# Patient Record
Sex: FEMALE | Race: WHITE | Employment: FULL TIME | URBAN - METROPOLITAN AREA
[De-identification: names, ages, dates, MRNs, and addresses within clinical notes are randomized per-mention and may not be internally consistent; named-entity substitution may affect disease eponyms.]

---

## 2017-10-25 ENCOUNTER — HOSPITAL ENCOUNTER (OUTPATIENT)
Dept: MAMMOGRAPHY | Age: 42
Discharge: HOME | End: 2017-10-25
Attending: SPECIALIST
Payer: COMMERCIAL

## 2017-10-25 DIAGNOSIS — Z12.31 VISIT FOR SCREENING MAMMOGRAM: ICD-10-CM

## 2017-10-25 PROCEDURE — 77067 SCR MAMMO BI INCL CAD: CPT

## 2017-12-15 DIAGNOSIS — F41.8 DEPRESSION WITH ANXIETY: ICD-10-CM

## 2017-12-15 NOTE — TELEPHONE ENCOUNTER
From: Mary Roth  To: Gabi Alexander MD  Sent: 12/15/2017 7:27 AM EST  Subject: Medication Renewal Request    Original authorizing provider: MD Misha Mcdonnellan ABIGAIL Roth would like a refill of the following medications:  PRISTIQ 50 mg tablet [Mary Byrnes MD]    Preferred pharmacy: [facility]ACY #[id number] [address]:  [facility], I thought my pharmacy had requested approval to refill this medicine. I checked the site and it does not look like it. I have one pill left! May I please get a refill on this today? Many thanks, Marce Harper sent pt a note that she needs to schedule a f/u appt right away.

## 2017-12-16 RX ORDER — DESVENLAFAXINE SUCCINATE 50 MG/1
50 TABLET, EXTENDED RELEASE ORAL DAILY
Qty: 90 TAB | Refills: 0 | Status: SHIPPED | OUTPATIENT
Start: 2017-12-16 | End: 2018-01-10 | Stop reason: SDUPTHER

## 2018-01-10 ENCOUNTER — OFFICE VISIT (OUTPATIENT)
Dept: FAMILY MEDICINE CLINIC | Age: 43
End: 2018-01-10

## 2018-01-10 VITALS
BODY MASS INDEX: 31.52 KG/M2 | TEMPERATURE: 98.5 F | SYSTOLIC BLOOD PRESSURE: 110 MMHG | RESPIRATION RATE: 18 BRPM | HEART RATE: 82 BPM | DIASTOLIC BLOOD PRESSURE: 74 MMHG | OXYGEN SATURATION: 97 % | WEIGHT: 208 LBS | HEIGHT: 68 IN

## 2018-01-10 DIAGNOSIS — E78.1 HIGH TRIGLYCERIDES: ICD-10-CM

## 2018-01-10 DIAGNOSIS — F41.8 DEPRESSION WITH ANXIETY: ICD-10-CM

## 2018-01-10 DIAGNOSIS — Z00.00 ROUTINE GENERAL MEDICAL EXAMINATION AT A HEALTH CARE FACILITY: Primary | ICD-10-CM

## 2018-01-10 DIAGNOSIS — E01.0 THYROMEGALY: ICD-10-CM

## 2018-01-10 DIAGNOSIS — E55.9 VITAMIN D DEFICIENCY: ICD-10-CM

## 2018-01-10 PROBLEM — S83.241A TEAR OF MEDIAL MENISCUS OF RIGHT KNEE, CURRENT: Status: ACTIVE | Noted: 2017-06-07

## 2018-01-10 PROBLEM — G43.109 MIGRAINE WITH AURA AND WITHOUT STATUS MIGRAINOSUS, NOT INTRACTABLE: Status: ACTIVE | Noted: 2018-01-10

## 2018-01-10 PROBLEM — M22.2X1 PATELLOFEMORAL STRESS SYNDROME OF RIGHT KNEE: Status: ACTIVE | Noted: 2017-06-07

## 2018-01-10 RX ORDER — DESVENLAFAXINE SUCCINATE 50 MG/1
50 TABLET, EXTENDED RELEASE ORAL DAILY
Qty: 90 TAB | Refills: 3 | Status: SHIPPED | OUTPATIENT
Start: 2018-01-10 | End: 2019-03-05 | Stop reason: SDUPTHER

## 2018-01-10 RX ORDER — MELOXICAM 15 MG/1
TABLET ORAL
COMMUNITY
Start: 2017-10-09 | End: 2018-08-20

## 2018-01-10 NOTE — PATIENT INSTRUCTIONS
Body Mass Index: Care Instructions  Your Care Instructions    Body mass index (BMI) can help you see if your weight is raising your risk for health problems. It uses a formula to compare how much you weigh with how tall you are. · A BMI lower than 18.5 is considered underweight. · A BMI between 18.5 and 24.9 is considered healthy. · A BMI between 25 and 29.9 is considered overweight. A BMI of 30 or higher is considered obese. If your BMI is in the normal range, it means that you have a lower risk for weight-related health problems. If your BMI is in the overweight or obese range, you may be at increased risk for weight-related health problems, such as high blood pressure, heart disease, stroke, arthritis or joint pain, and diabetes. If your BMI is in the underweight range, you may be at increased risk for health problems such as fatigue, lower protection (immunity) against illness, muscle loss, bone loss, hair loss, and hormone problems. BMI is just one measure of your risk for weight-related health problems. You may be at higher risk for health problems if you are not active, you eat an unhealthy diet, or you drink too much alcohol or use tobacco products. Follow-up care is a key part of your treatment and safety. Be sure to make and go to all appointments, and call your doctor if you are having problems. It's also a good idea to know your test results and keep a list of the medicines you take. How can you care for yourself at home? · Practice healthy eating habits. This includes eating plenty of fruits, vegetables, whole grains, lean protein, and low-fat dairy. · If your doctor recommends it, get more exercise. Walking is a good choice. Bit by bit, increase the amount you walk every day. Try for at least 30 minutes on most days of the week. · Do not smoke. Smoking can increase your risk for health problems. If you need help quitting, talk to your doctor about stop-smoking programs and medicines. These can increase your chances of quitting for good. · Limit alcohol to 2 drinks a day for men and 1 drink a day for women. Too much alcohol can cause health problems. If you have a BMI higher than 25  · Your doctor may do other tests to check your risk for weight-related health problems. This may include measuring the distance around your waist. A waist measurement of more than 40 inches in men or 35 inches in women can increase the risk of weight-related health problems. · Talk with your doctor about steps you can take to stay healthy or improve your health. You may need to make lifestyle changes to lose weight and stay healthy, such as changing your diet and getting regular exercise. If you have a BMI lower than 18.5  · Your doctor may do other tests to check your risk for health problems. · Talk with your doctor about steps you can take to stay healthy or improve your health. You may need to make lifestyle changes to gain or maintain weight and stay healthy, such as getting more healthy foods in your diet and doing exercises to build muscle. Where can you learn more? Go to http://osmin-dustin.info/. Enter S176 in the search box to learn more about \"Body Mass Index: Care Instructions. \"  Current as of: October 13, 2016  Content Version: 11.4  © 0467-0779 Healthwise, Incorporated. Care instructions adapted under license by [employer] (which disclaims liability or warranty for this information). If you have questions about a medical condition or this instruction, always ask your healthcare professional. [place] [address] any warranty or liability for your use of this information. Well Visit, Ages 25 to 48: Care Instructions  Your Care Instructions    Physical exams can help you stay healthy. Your doctor has checked your overall health and may have suggested ways to take good care of yourself. He or she also may have recommended tests.  At home, you can help prevent illness with healthy eating, regular exercise, and other steps. Follow-up care is a key part of your treatment and safety. Be sure to make and go to all appointments, and call your doctor if you are having problems. It's also a good idea to know your test results and keep a list of the medicines you take. How can you care for yourself at home? · Reach and stay at a healthy weight. This will lower your risk for many problems, such as obesity, diabetes, heart disease, and high blood pressure. · Get at least 30 minutes of physical activity on most days of the week. Walking is a good choice. You also may want to do other activities, such as running, swimming, cycling, or playing tennis or team sports. Discuss any changes in your exercise program with your doctor. · Do not smoke or allow others to smoke around you. If you need help quitting, talk to your doctor about stop-smoking programs and medicines. These can increase your chances of quitting for good. · Talk to your doctor about whether you have any risk factors for sexually transmitted infections (STIs). Having one sex partner (who does not have STIs and does not have sex with anyone else) is a good way to avoid these infections. · Use birth control if you do not want to have children at this time. Talk with your doctor about the choices available and what might be best for you. · Protect your skin from too much sun. When you're outdoors from 10 a.m. to 4 p.m., stay in the shade or cover up with clothing and a hat with a wide brim. Wear sunglasses that block UV rays. Even when it's cloudy, put broad-spectrum sunscreen (SPF 30 or higher) on any exposed skin. · See a dentist one or two times a year for checkups and to have your teeth cleaned. · Wear a seat belt in the car. · Drink alcohol in moderation, if at all. That means no more than 2 drinks a day for men and 1 drink a day for women.   Follow your doctor's advice about when to have certain tests. These tests can spot problems early. For everyone  · Cholesterol. Have the fat (cholesterol) in your blood tested after age 21. Your doctor will tell you how often to have this done based on your age, family history, or other things that can increase your risk for heart disease. · Blood pressure. Have your blood pressure checked during a routine doctor visit. Your doctor will tell you how often to check your blood pressure based on your age, your blood pressure results, and other factors. · Vision. Talk with your doctor about how often to have a glaucoma test.  · Diabetes. Ask your doctor whether you should have tests for diabetes. · Colon cancer. Have a test for colon cancer at age 48. You may have one of several tests. If you are younger than 48, you may need a test earlier if you have any risk factors. Risk factors include whether you already had a precancerous polyp removed from your colon or whether your parent, brother, sister, or child has had colon cancer. For women  · Breast exam and mammogram. Talk to your doctor about when you should have a clinical breast exam and a mammogram. Medical experts differ on whether and how often women under 50 should have these tests. Your doctor can help you decide what is right for you. · Pap test and pelvic exam. Begin Pap tests at age 24. A Pap test is the best way to find cervical cancer. The test often is part of a pelvic exam. Ask how often to have this test.  · Tests for sexually transmitted infections (STIs). Ask whether you should have tests for STIs. You may be at risk if you have sex with more than one person, especially if your partners do not wear condoms. For men  · Tests for sexually transmitted infections (STIs). Ask whether you should have tests for STIs. You may be at risk if you have sex with more than one person, especially if you do not wear a condom.   · Testicular cancer exam. Ask your doctor whether you should check your testicles regularly. · Prostate exam. Talk to your doctor about whether you should have a blood test (called a PSA test) for prostate cancer. Experts differ on whether and when men should have this test. Some experts suggest it if you are older than 39 and are -American or have a father or brother who got prostate cancer when he was younger than 72. When should you call for help? Watch closely for changes in your health, and be sure to contact your doctor if you have any problems or symptoms that concern you. Where can you learn more? Go to http://osmin-dustin.info/. Enter P072 in the search box to learn more about \"Well Visit, Ages 25 to 48: Care Instructions. \"  Current as of: May 12, 2017  Content Version: 11.4  © 7253-2848 [employer]. Care instructions adapted under license by "[employer]," (which disclaims liability or warranty for this information). If you have questions about a medical condition or this instruction, always ask your healthcare professional. [place] [address] any warranty or liability for your use of this information. High Cholesterol: Care Instructions  Your Care Instructions    Cholesterol is a type of fat in your blood. It is needed for many body functions, such as making new cells. Cholesterol is made by your body. It also comes from food you eat. High cholesterol means that you have too much of the fat in your blood. This raises your risk of a heart attack and stroke. LDL and HDL are part of your total cholesterol. LDL is the \"bad\" cholesterol. High LDL can raise your risk for heart disease, heart attack, and stroke. HDL is the \"good\" cholesterol. It helps clear bad cholesterol from the body. High HDL is linked with a lower risk of heart disease, heart attack, and stroke. Your cholesterol levels help your doctor find out your risk for having a heart attack or stroke.  You and your doctor can talk about whether you need to lower your risk and what treatment is best for you. A heart-healthy lifestyle along with medicines can help lower your cholesterol and your risk. The way you choose to lower your risk will depend on how high your risk is for heart attack and stroke. It will also depend on how you feel about taking medicines. Follow-up care is a key part of your treatment and safety. Be sure to make and go to all appointments, and call your doctor if you are having problems. It's also a good idea to know your test results and keep a list of the medicines you take. How can you care for yourself at home? · Eat a variety of foods every day. Good choices include fruits, vegetables, whole grains (like oatmeal), dried beans and peas, nuts and seeds, soy products (like tofu), and fat-free or low-fat dairy products. · Replace butter, margarine, and hydrogenated or partially hydrogenated oils with olive and canola oils. (Canola oil margarine without trans fat is fine.)  · Replace red meat with fish, poultry, and soy protein (like tofu). · Limit processed and packaged foods like chips, crackers, and cookies. · Bake, broil, or steam foods. Don't rangel them. · Be physically active. Get at least 30 minutes of exercise on most days of the week. Walking is a good choice. You also may want to do other activities, such as running, swimming, cycling, or playing tennis or team sports. · Stay at a healthy weight or lose weight by making the changes in eating and physical activity listed above. Losing just a small amount of weight, even 5 to 10 pounds, can reduce your risk for having a heart attack or stroke. · Do not smoke. When should you call for help? Watch closely for changes in your health, and be sure to contact your doctor if:  ? · You need help making lifestyle changes. ? · You have questions about your medicine. Where can you learn more? Go to http://osmin-dustin.info/.   Enter T592 in the search box to learn more about \"High Cholesterol: Care Instructions. \"  Current as of: September 21, 2016  Content Version: 11.4  © 6182-3127 Healthwise, Incorporated. Care instructions adapted under license by [employer] (which disclaims liability or warranty for this information). If you have questions about a medical condition or this instruction, always ask your healthcare professional. [address] any warranty or liability for your use of this information.

## 2018-01-10 NOTE — PROGRESS NOTES
Chief Complaint   Patient presents with    Complete Physical     not fasting - has gyn for well woman      1. Have you been to the ER, urgent care clinic since your last visit? Hospitalized since your last visit? No    2. Have you seen or consulted any other health care providers outside of the [address] [place] [address] since your last visit? Include any pap smears or colon screening.  No

## 2018-01-10 NOTE — PROGRESS NOTES
HISTORY OF PRESENT ILLNESS   HPI  Annual CPE (not fasting today). Sees gyn annually for well woman visits, gyn exams in the fall and all reportedly normal.  Her periods are regular q month, lasts 5 days, heavy the first 2-3 then light. No issues. Overall has been getting along well and feeling well in general.  Pristiq working well for her anxiety and depression, mood has been stable and well controlled. Since 1-1-18 she has been following a low carb diet and states she can notice a difference in how she feels in general. Less sluggish. She reports h/o migraine headaches in her childhood and again in her 21's for a while and they hadnt really bothered her up until 2 mild migraines that she had in the fall, none since then. They were located more frontal and periorbital region, preceded by visual aura. Easily went away w/ OTC medication and didn't last longer than a day or 2. Her childhood migraines were associated w/ N/V, but not so much in her adult life. All of them are typically associated w/ visual aura, wavy lives but no other neuro symptoms, deficits or other sequela. She saw her eye doctor w/in the past year, no major concerns. She states she never really had to see a neurologist and suffered no complications from them. REVIEW OF SYMPTOMS     Review of Systems   Constitutional: Negative. HENT: Negative. No neck/throat pain, hoarseness, dysphagia   Eyes: Negative. Respiratory: Negative. Cardiovascular: Negative. Gastrointestinal: Negative. Genitourinary: Negative. Musculoskeletal:        Followed by Ortho for PFS of knees, takes Mobic prn but hasnt needed any x 2months   Neurological: Negative for dizziness, sensory change, speech change and focal weakness. Endo/Heme/Allergies: Positive for environmental allergies (uses Claritin and Rhinocort seasonally, not lately).    Psychiatric/Behavioral: Negative.            PROBLEM LIST/MEDICAL HISTORY      Problem List  Date Reviewed: 1/10/2018          Codes Class Noted    Patellofemoral stress syndrome of right knee ICD-10-CM: M22.2X1  ICD-9-CM: 719.46  2017        Tear of medial meniscus of right knee, current ICD-10-CM: O75.576U  ICD-9-CM: 836.0  2017        Mild Thyromegaly w/o cysts or nodules and without obstructive symptoms ICD-10-CM: E01.0  ICD-9-CM: 240.9  2016    Overview Addendum 1/10/2018 11:36 AM by Brandon Varma MD      : Mildly enlarged left thyroid lobe             Vitamin D deficiency ICD-10-CM: E55.9  ICD-9-CM: 268.9  2014    Overview Signed 2014  1:33 PM by Brandon Varma MD                  Mildly Elevated Triglycerides ICD-10-CM: E78.1  ICD-9-CM: 272.1  2014        Degenerative arthritis of great toe w/ bone spurs ICD-10-CM: M19.079  ICD-9-CM: 715.97  2014    Overview Signed 2014  2:06 PM by Brandon Varma MD     S/p CSI of toe per foot doctor; surgery is recommended              Breast cyst ICD-10-CM: N60.09  ICD-9-CM: 610.0  Unknown    Overview Signed 3/31/2010  3:43 PM by Brandon Varma MD     Excised R Breast : Dr Sarah Shell             Seasonal allergic rhinitis ICD-10-CM: J30.2  ICD-9-CM: 477.9  Unknown    Overview Signed 3/31/2010  3:44 PM by Brandon Varma MD     Spring/             Depression with anxiety ICD-10-CM: F41.8  ICD-9-CM: 300.4  Unknown        Postpartum depression ICD-10-CM: F53  ICD-9-CM: 648.44, 737  2009    Overview Addendum 2014  1:34 PM by Brandon Varma MD     3/18/09  @ 38 wks fetal distress  3/2013 treated again for post partum             C section ICD-9-CM: V45.89  3/18/2009    Overview Signed 3/31/2010  3:41 PM by Brandon Varma MD     GYN Dr. Loco Hamilton;  Fetal distress               H/O Migraine Headaches (details in HPI above)        PAST SURGICAL HISTORY       Past Surgical History:   Procedure Laterality Date    BREAST SURGERY PROCEDURE UNLISTED   breast right cyst benign    HX BREAST BIOPSY Right 2009    neg    HX  SECTION  2009    HX  SECTION  3/17/2013    HX LAP CHOLECYSTECTOMY  13    Dr Jr Hemphill for gallstones    HX ORTHOPAEDIC Right 2014    cheilectomy; right foot, spur, arthritis         MEDICATIONS      Current Outpatient Prescriptions   Medication Sig    desvenlafaxine succinate (PRISTIQ) 50 mg ER tablet Take 1 Tab by mouth daily.  loratadine (CLARITIN) 10 mg tablet Take 10 mg by mouth daily as needed.  multivitamin (ONE A DAY) tablet Take 1 Tab by mouth daily.  meloxicam (MOBIC) 15 mg tablet TAKE 1 TABLET (15 MG TOTAL) BY MOUTH DAILY AS NEEDED    budesonide (RHINOCORT AQUA) 32 mcg/actuation nasal spray 2 Sprays by Both Nostrils route daily. (Patient taking differently: 2 Sprays by Both Nostrils route daily as needed.)     No current facility-administered medications for this visit.            ALLERGIES     Allergies   Allergen Reactions    Adhesive Rash and Itching    Sulfa (Sulfonamide Antibiotics) Nausea Only and Other (comments)     dizziness    Wellbutrin [Bupropion Hcl] Other (comments)     Increased agitation, not effective for mood          SOCIAL HISTORY       Social History     Social History    Marital status:      Spouse name: N/A    Number of children: 2    Years of education: N/A     Occupational History    Teacher at [employer], full time    [place] [employer]     Social History Main Topics    Smoking status: Never Smoker    Smokeless tobacco: Never Used    Alcohol use Yes      Comment: socially a few x a month, occ    Drug use: No    Sexual activity: Yes     Partners: Male     Birth control/ protection: Condom      Comment: Stopped her BCP 2013 d/t fluid retention     Other Topics Concern    Caffeine Concern No     1 cup of coffee a day    Weight Concern No    Special Diet Yes     since 20: low carb diet and more portion control    Exercise Yes since summer 2016: 2 x a week: cycles x 45 minutes     Social History Narrative        IMMUNIZATIONS  Immunization History   Administered Date(s) Administered    Influenza Vaccine 10/15/2015, 10/20/2016, 2017    Influenza Vaccine (Quad) PF 2015    TD Vaccine 2011         FAMILY HISTORY     Family History   Problem Relation Age of Onset    High Cholesterol Mother     Hypertension Mother     Elevated Lipids Mother     Depression Mother      Zoloft    Thyroid Disease Mother     Liver Disease Mother 79     autoimmune hepatitis    Asthma Father     Heart Disease Father      cardiomegaly    Other Brother      good health    Other Sister      good health    Stroke Maternal Grandmother       [de-identified]    Heart Disease Maternal Grandmother     Depression Maternal Grandmother     Heart Disease Maternal Grandfather       80    Dementia Maternal Grandfather     Heart Disease Paternal Grandmother       [de-identified]    Cancer Paternal Grandfather      oral CA pipe smoker,  80's    Depression Maternal Aunt      2 aunts    Thyroid Disease Maternal Aunt      2 aunts    Depression Maternal Uncle     Attention Deficit Hyperactivity Disorder Son 8     and Dyslexia    No Known Problems Son          VITALS     Visit Vitals    /74 (BP 1 Location: Left arm, BP Patient Position: Sitting)    Pulse 82    Temp 98.5 °F (36.9 °C) (Oral)    Resp 18    Ht 5' 8\" (1.727 m)    Wt 208 lb (94.3 kg)    LMP 2018    SpO2 97%    BMI 31.63 kg/m2          PHYSICAL EXAMINATION     Physical Exam   Constitutional: She is oriented to person, place, and time and well-developed, well-nourished, and in no distress. HENT:   Right Ear: Tympanic membrane normal.   Left Ear: Tympanic membrane normal.   Nose: Nose normal.   Mouth/Throat: Oropharynx is clear and moist. No oropharyngeal exudate. Eyes: Conjunctivae and EOM are normal. Pupils are equal, round, and reactive to light.    Neck: Thyromegaly (mild) present. Cardiovascular: Normal rate, regular rhythm and normal heart sounds. No murmur heard. Pulmonary/Chest: Effort normal and breath sounds normal. No respiratory distress. Abdominal: Soft. Bowel sounds are normal. She exhibits no distension and no mass. There is no tenderness. Musculoskeletal: Normal range of motion. She exhibits no edema or tenderness. Lymphadenopathy:     She has no cervical adenopathy. Neurological: She is alert and oriented to person, place, and time. No cranial nerve deficit. Gait normal. Coordination normal.   Skin: Skin is warm. Psychiatric: Mood and affect normal.   Vitals reviewed.              ASSESSMENT & PLAN       ICD-10-CM ICD-9-CM    1. Routine general medical examination at a health care facility Z00.00 V70.0 LIPID PANEL      METABOLIC PANEL, COMPREHENSIVE      CBC W/O DIFF      TSH 3RD GENERATION      VITAMIN D, 25 HYDROXY   2. Mildly Elevated Triglycerides E78.1 272.1 LIPID PANEL   3. Vitamin D deficiency E55.9 268.9 VITAMIN D, 25 HYDROXY   4. Depression with anxiety, well controlled on Pristiq F41.8 300.4 desvenlafaxine succinate (PRISTIQ) 50 mg ER tablet   5. Mild Thyromegaly w/o cysts or nodules and without obstructive symptoms E01.0 240.9 TSH 3RD GENERATION     She will RTC for the above labs fasting on 1-31-18   Reviewed diet, exercise and weight control; Patient counseled about current BMI, goals, diet, nutrition, exercise, weight management. Nutrition/meal planning discussed. Monitor weights. Appropriate patient education materials included with or without corresponding AVS via handout or MyChart if activated. Reassess at next follow up visit. Cardiovascular risk and specific lipid/LDL goals reviewed  Sees gyn annually for well woman visits/gyn exams, reportedly normal in the fall. Mammogram screen negative   Further follow up & other recommendations pending review of labs. If all remains good and stable, RTC 1 yr CPE.  Follow up sooner prn

## 2018-01-15 DIAGNOSIS — E55.9 VITAMIN D DEFICIENCY: ICD-10-CM

## 2018-01-15 DIAGNOSIS — Z00.00 ROUTINE GENERAL MEDICAL EXAMINATION AT A HEALTH CARE FACILITY: ICD-10-CM

## 2018-01-16 LAB
25(OH)D3+25(OH)D2 SERPL-MCNC: 21.5 NG/ML (ref 30–100)
ALBUMIN SERPL-MCNC: 4.3 G/DL (ref 3.5–5.5)
ALBUMIN/GLOB SERPL: 1.8 {RATIO} (ref 1.2–2.2)
ALP SERPL-CCNC: 60 IU/L (ref 39–117)
ALT SERPL-CCNC: 22 IU/L (ref 0–32)
AST SERPL-CCNC: 13 IU/L (ref 0–40)
BILIRUB SERPL-MCNC: 0.4 MG/DL (ref 0–1.2)
BUN SERPL-MCNC: 14 MG/DL (ref 6–24)
BUN/CREAT SERPL: 23 (ref 9–23)
CALCIUM SERPL-MCNC: 8.9 MG/DL (ref 8.7–10.2)
CHLORIDE SERPL-SCNC: 103 MMOL/L (ref 96–106)
CHOLEST SERPL-MCNC: 177 MG/DL (ref 100–199)
CO2 SERPL-SCNC: 25 MMOL/L (ref 18–29)
CREAT SERPL-MCNC: 0.62 MG/DL (ref 0.57–1)
ERYTHROCYTE [DISTWIDTH] IN BLOOD BY AUTOMATED COUNT: 13.3 % (ref 12.3–15.4)
GLOBULIN SER CALC-MCNC: 2.4 G/DL (ref 1.5–4.5)
GLUCOSE SERPL-MCNC: 89 MG/DL (ref 65–99)
HCT VFR BLD AUTO: 39 % (ref 34–46.6)
HDLC SERPL-MCNC: 46 MG/DL
HGB BLD-MCNC: 12.9 G/DL (ref 11.1–15.9)
INTERPRETATION, 910389: NORMAL
LDLC SERPL CALC-MCNC: 111 MG/DL (ref 0–99)
MCH RBC QN AUTO: 30.3 PG (ref 26.6–33)
MCHC RBC AUTO-ENTMCNC: 33.1 G/DL (ref 31.5–35.7)
MCV RBC AUTO: 92 FL (ref 79–97)
PLATELET # BLD AUTO: 273 X10E3/UL (ref 150–379)
POTASSIUM SERPL-SCNC: 4.5 MMOL/L (ref 3.5–5.2)
PROT SERPL-MCNC: 6.7 G/DL (ref 6–8.5)
RBC # BLD AUTO: 4.26 X10E6/UL (ref 3.77–5.28)
SODIUM SERPL-SCNC: 137 MMOL/L (ref 134–144)
TRIGL SERPL-MCNC: 101 MG/DL (ref 0–149)
TSH SERPL DL<=0.005 MIU/L-ACNC: 1.32 UIU/ML (ref 0.45–4.5)
VLDLC SERPL CALC-MCNC: 20 MG/DL (ref 5–40)
WBC # BLD AUTO: 3.1 X10E3/UL (ref 3.4–10.8)

## 2018-01-28 ENCOUNTER — TELEPHONE (OUTPATIENT)
Dept: FAMILY MEDICINE CLINIC | Age: 43
End: 2018-01-28

## 2018-01-28 DIAGNOSIS — D70.8 OTHER NEUTROPENIA (HCC): Primary | ICD-10-CM

## 2018-01-28 NOTE — TELEPHONE ENCOUNTER
BS, liver, kidney, thyroid normal    WBC slightly on low end. Can just repeat lab only in a few weeks, no appt needed, no need to fast, order pended    LDL mildly elevated but acceptable for her being low risk  HDL 46, goal for women >50. Adhere to the following Lifestyle Modifications below:  Diet:  ~Consume a dietary pattern that emphasizes intake of vegetables, fruits, whole grains, poultry, fish, low fat dairy, legumes, non tropical vegetable oils and nuts. ~Avoid red meat, saturated fats, fried foods, sweets, and sugars. ~Avoid added salt and if you have a history of high blood pressure also avoid added salt and limit sodium intake to < 2 grams per day. ~Reference the AHA (American Heart Association) Diet or DASH diet for additional guidelines. ~Limit alcohol to no more than 1 standard drink per day for women and 2 for men (ie/ 12 oz beer, 5 oz wine, 1.5 oz liquor). ~Avoid tobacco products. ~Limit caffeine to no more than 1-2 small servings per day. Exercise:  ~Get regular moderate intensity cardio/aerobic exercise at least 150 minutes per week ie/ 30-50 minutes 3-4 x a week. Your vitamin D level is measured as low or low normal, but there is no standard normal range used by all laboratories. The [place] of [employer] recommends a blood level of 20 ng/mL of vitamin D for healthy bones, but many labs reference at least a level of 30. However too much vitamin D can also be harmful and carries health risks as well. Children 1-17 yo, pregnant women and healthy low risk adults up to age 71 yo should consume at least 600 to 800 international units of vitamin D3 daily. Foods that contain vitamin D include:  · [place], tuna, and mackerel. These are some of the best foods to eat when you need to get more vitamin D.  · Cheese, egg yolks, and beef liver. These foods have vitamin D in small amounts.   · Milk, soy drinks, orange juice, yogurt, margarine, and some kinds of cereal have vitamin D added to them.     CPE 1 yr

## 2018-04-18 DIAGNOSIS — D70.8 OTHER NEUTROPENIA (HCC): ICD-10-CM

## 2018-04-19 LAB
BASOPHILS # BLD AUTO: 0 X10E3/UL (ref 0–0.2)
BASOPHILS NFR BLD AUTO: 0 %
EOSINOPHIL # BLD AUTO: 0.1 X10E3/UL (ref 0–0.4)
EOSINOPHIL NFR BLD AUTO: 2 %
ERYTHROCYTE [DISTWIDTH] IN BLOOD BY AUTOMATED COUNT: 13.5 % (ref 12.3–15.4)
HCT VFR BLD AUTO: 39.2 % (ref 34–46.6)
HGB BLD-MCNC: 13.2 G/DL (ref 11.1–15.9)
IMM GRANULOCYTES # BLD: 0 X10E3/UL (ref 0–0.1)
IMM GRANULOCYTES NFR BLD: 0 %
LYMPHOCYTES # BLD AUTO: 1.5 X10E3/UL (ref 0.7–3.1)
LYMPHOCYTES NFR BLD AUTO: 33 %
MCH RBC QN AUTO: 30.3 PG (ref 26.6–33)
MCHC RBC AUTO-ENTMCNC: 33.7 G/DL (ref 31.5–35.7)
MCV RBC AUTO: 90 FL (ref 79–97)
MONOCYTES # BLD AUTO: 0.4 X10E3/UL (ref 0.1–0.9)
MONOCYTES NFR BLD AUTO: 8 %
NEUTROPHILS # BLD AUTO: 2.6 X10E3/UL (ref 1.4–7)
NEUTROPHILS NFR BLD AUTO: 57 %
PLATELET # BLD AUTO: 281 X10E3/UL (ref 150–379)
RBC # BLD AUTO: 4.36 X10E6/UL (ref 3.77–5.28)
SPECIMEN STATUS REPORT, ROLRST: NORMAL
WBC # BLD AUTO: 4.5 X10E3/UL (ref 3.4–10.8)

## 2018-04-23 ENCOUNTER — TELEPHONE (OUTPATIENT)
Dept: FAMILY MEDICINE CLINIC | Age: 43
End: 2018-04-23

## 2018-05-29 ENCOUNTER — TELEPHONE (OUTPATIENT)
Dept: FAMILY MEDICINE CLINIC | Age: 43
End: 2018-05-29

## 2018-05-29 NOTE — TELEPHONE ENCOUNTER
Fasting BS was 129        HDL 49    Work on the following and have a fasting follow up in 8-12 weeks to reassess. Meal Planning and Exercise:  ~Avoid sweets and sugars. ~Limit carbohydrate intake to between 30-45 grams of carbs max per meal and no more than 15 grams of carbs per snack  ~Do not skip meals. ~Eat light snacks between meals that are low in fat and high in protein. ~Divide your plate by types of foods. Put non-starchy vegetables on half the plate, meat or other protein food on one-quarter of the plate, and a grain or starchy vegetable in the final quarter of the plate. Keep starches dark in color trying to void white starches in general.  ~Limit alcohol to no more than 1 standard drink per day for women and 2 for men (ie/ 12 oz beer, 5 oz wine, 1.5 oz liquor). ~Get regular moderate intensity cardio/aerobic exercise at least 150 minutes per week ie/ 30-50 minutes 3-4 x a week. ~Reference the ADA (American Diabetes Association Diet) for additional nutrition tips. ~We can offer referral to a certified diabetes educator or our nutrition services programs if needed.

## 2018-05-29 NOTE — TELEPHONE ENCOUNTER
----- Message from Delbert Berman LPN sent at 4/63/5532  1:28 PM EDT -----  Regarding: FW: Test Results Question  Contact: [phone]  I printed off the labs.  ----- Message -----     From: Susan Austin     Sent: 5/29/2018  10:49 AM       To: [facility]  Subject: RE: Test Results Question                        Yes I was fasting. I had the blood drawn first thing in the am.  ----- Message -----  From: Delbert Berman LPN  Sent: 7/43/7228  9:52 AM EDT  To: Karen Roth  Subject: RE: Test Results Question    Were you fasting when you had this test?  If so how long? If not when had you eaten last?  MJ    ----- Message -----     From: Tonia Roth     Sent: 5/27/2018  5:33 PM EDT       To: Betzaida Ly MD  Subject: Test Results Question    Hello,    For our health insurance through my 's employer, we have to have an annual biometrics screening. I had this blood work completed at the start of May. I am concerned by my glucose level, especially compared to the level I read just a few months ago in January. I am wondering if I need it to be rechecked or have further testing done. I am attaching the results for Dr. Ofelia Watson; review.     Many thanks,  [email or web address]

## 2018-08-08 ENCOUNTER — TELEPHONE (OUTPATIENT)
Dept: FAMILY MEDICINE CLINIC | Age: 43
End: 2018-08-08

## 2018-08-08 DIAGNOSIS — E78.1 HIGH TRIGLYCERIDES: ICD-10-CM

## 2018-08-08 DIAGNOSIS — R73.01 IMPAIRED FASTING GLUCOSE: Primary | ICD-10-CM

## 2018-08-08 DIAGNOSIS — E01.0 THYROMEGALY: ICD-10-CM

## 2018-08-08 NOTE — TELEPHONE ENCOUNTER
----- Message from [address] [place] [address]. [employer]il sent at 8/8/2018  1:39 PM EDT -----  Regarding: Non-Urgent Medical Question  Contact: [phone]  Artur JIMENEZ,    I have a follow up blood work appointment with Dr. Alaina Thornton on August 20 at 8:15. I plan to go on Monday, August 13 for the blood work. You asked me to email you 10 days out so that you could put in the order for the blood work. So, this is my email reminder! I would really like my thyroid checked so can that be included in the order?     Many thanks,  [place]

## 2018-08-14 LAB
CHOLEST SERPL-MCNC: 187 MG/DL (ref 100–199)
EST. AVERAGE GLUCOSE BLD GHB EST-MCNC: 100 MG/DL
GLUCOSE SERPL-MCNC: 92 MG/DL (ref 65–99)
HBA1C MFR BLD: 5.1 % (ref 4.8–5.6)
HDLC SERPL-MCNC: 50 MG/DL
INTERPRETATION, 910389: NORMAL
LDLC SERPL CALC-MCNC: 113 MG/DL (ref 0–99)
T3 SERPL-MCNC: 106 NG/DL (ref 71–180)
T4 FREE SERPL-MCNC: 1.2 NG/DL (ref 0.82–1.77)
TRIGL SERPL-MCNC: 121 MG/DL (ref 0–149)
TSH SERPL DL<=0.005 MIU/L-ACNC: 2.4 UIU/ML (ref 0.45–4.5)
VLDLC SERPL CALC-MCNC: 24 MG/DL (ref 5–40)

## 2018-08-20 ENCOUNTER — OFFICE VISIT (OUTPATIENT)
Dept: FAMILY MEDICINE CLINIC | Age: 43
End: 2018-08-20

## 2018-08-20 VITALS
RESPIRATION RATE: 18 BRPM | SYSTOLIC BLOOD PRESSURE: 120 MMHG | HEART RATE: 81 BPM | TEMPERATURE: 98.3 F | OXYGEN SATURATION: 98 % | BODY MASS INDEX: 31.55 KG/M2 | WEIGHT: 208.2 LBS | DIASTOLIC BLOOD PRESSURE: 74 MMHG | HEIGHT: 68 IN

## 2018-08-20 DIAGNOSIS — R73.01 IMPAIRED FASTING GLUCOSE: Primary | ICD-10-CM

## 2018-08-20 DIAGNOSIS — E78.00 MILD HYPERCHOLESTEROLEMIA: ICD-10-CM

## 2018-08-20 NOTE — PROGRESS NOTES
HISTORY OF PRESENT ILLNESS   HPI  Follow up mild hypercholesterolemia and impaired fasting glucose. At biometric screening back in 5-2018, her fasting BS was 129 and her TG were 168. We messaged her some diet/exercise recommendations and advised her to follow up 3 months later to reassess. She had those fasting follow up labs done last week on 8-13-18. She has been working on diet/exercise as detailed below. Feels well in general.     REVIEW OF SYMPTOMS     Review of Systems   Constitutional: Negative. Respiratory: Negative. Cardiovascular: Negative.     Gastrointestinal: Negative.            PROBLEM LIST/MEDICAL HISTORY      Problem List  Date Reviewed: 8/20/2018          Codes Class Noted    Mild hypercholesterolemia ICD-10-CM: E78.00  ICD-9-CM: 272.0  8/20/2018        Impaired fasting glucose ICD-10-CM: R73.01  ICD-9-CM: 790.21  8/8/2018    Overview Signed 8/8/2018 10:06 PM by Estefany Cr MD     [id number]             Migraine with aura and without status migrainosus, not intractable ICD-10-CM: G43.109  ICD-9-CM: 346.00  1/10/2018    Overview Signed 1/10/2018 11:43 AM by Estefany Cr MD     Childhood, 20's, 42; visual aura; no neuro sx             Patellofemoral stress syndrome of right knee ICD-10-CM: M22.2X1  ICD-9-CM: 719.46  6/7/2017        Tear of medial meniscus of right knee, current ICD-10-CM: X89.609Q  ICD-9-CM: 836.0  6/7/2017        Mild Thyromegaly w/o cysts or nodules and without obstructive symptoms ICD-10-CM: E01.0  ICD-9-CM: 240.9  12/12/2016    Overview Addendum 1/10/2018 11:36 AM by Estefany Cr MD      : Mildly enlarged left thyroid lobe             Vitamin D deficiency ICD-10-CM: E55.9  ICD-9-CM: 268.9  1/29/2014    Overview Signed 1/29/2014  1:33 PM by Estefany Cr MD     2011             Mildly Elevated Triglycerides ICD-10-CM: E78.1  ICD-9-CM: 272.1  1/29/2014        Degenerative arthritis of great toe w/ bone spurs ICD-10-CM: J86.401  ICD-9-CM: 715.97  2014    Overview Signed 2014  2:06 PM by Edilberto Miller MD     S/p CSI of toe per foot doctor; surgery is recommended              Breast cyst ICD-10-CM: N60.09  ICD-9-CM: 610.0  Unknown    Overview Signed 3/31/2010  3:43 PM by Edilberto Miller MD     Excised R Breast : Dr Mary Gagnon             Seasonal allergic rhinitis ICD-10-CM: J30.2  ICD-9-CM: 477.9  Unknown    Overview Signed 3/31/2010  3:44 PM by Edilberto Miller MD     Spring/             Depression with anxiety ICD-10-CM: F41.8  ICD-9-CM: 300.4  Unknown        Postpartum depression ICD-10-CM: F53  ICD-9-CM: 648.44, 845  2009    Overview Addendum 2014  1:34 PM by Edilberto Miller MD     3/18/09  @ 38 wks fetal distress  3/2013 treated again for post partum             C section ICD-9-CM: V45.89  3/18/2009    Overview Signed 3/31/2010  3:41 PM by Edilberto Miller MD     GYN Dr. Lior Smith; Fetal distress                       PAST SURGICAL HISTORY       Past Surgical History:   Procedure Laterality Date    BREAST SURGERY PROCEDURE UNLISTED       breast right cyst benign    HX BREAST BIOPSY Right     neg    HX  SECTION      HX  SECTION  3/17/2013    HX LAP CHOLECYSTECTOMY  13    Dr Ernestina Padilla for gallstones    HX ORTHOPAEDIC Right 2014    cheilectomy; right foot, spur, arthritis         MEDICATIONS      Current Outpatient Prescriptions   Medication Sig    desvenlafaxine succinate (PRISTIQ) 50 mg ER tablet Take 1 Tab by mouth daily.  loratadine (CLARITIN) 10 mg tablet Take 10 mg by mouth daily as needed.  budesonide (RHINOCORT AQUA) 32 mcg/actuation nasal spray 2 Sprays by Both Nostrils route daily. (Patient taking differently: 2 Sprays by Both Nostrils route daily as needed.)    multivitamin (ONE A DAY) tablet Take 1 Tab by mouth daily. No current facility-administered medications for this visit. ALLERGIES     Allergies   Allergen Reactions    Adhesive Rash and Itching    Sulfa (Sulfonamide Antibiotics) Nausea Only and Other (comments)     dizziness    Wellbutrin [Bupropion Hcl] Other (comments)     Increased agitation, not effective for mood          SOCIAL HISTORY       Social History     Social History    Marital status:      Spouse name: N/A    Number of children: 2    Years of education: N/A     Occupational History    Teacher at [employer], full time    [address] History Main Topics    Smoking status: Never Smoker    Smokeless tobacco: Never Used    Alcohol use Yes      Comment: socially a few x a month, occ    Drug use: No    Sexual activity: Yes     Partners: Male     Birth control/ protection: Condom      Comment: Stopped her BCP 2013 d/t fluid retention     Other Topics Concern    Caffeine Concern No     1 cup of coffee a day    Weight Concern No    Special Diet Yes     since 20: low carb diet and more portion control    Exercise Yes     3 x a week: walks 30-45 minutes; 1 x a week: cycling class x 45 minutes     Social History Narrative        IMMUNIZATIONS  Immunization History   Administered Date(s) Administered    Influenza Vaccine 10/15/2015, 10/20/2016, 2017    Influenza Vaccine (Quad) PF 2015    TD Vaccine 2011         FAMILY HISTORY     Family History   Problem Relation Age of Onset    High Cholesterol Mother     Hypertension Mother     Elevated Lipids Mother     Depression Mother      Zoloft    Thyroid Disease Mother     Liver Disease Mother 79     autoimmune hepatitis    Asthma Father     Heart Disease Father      cardiomegaly    Other Brother      good health    Other Sister      good health    Stroke Maternal Grandmother       [de-identified]    Heart Disease Maternal Grandmother     Depression Maternal Grandmother     Heart Disease Maternal Grandfather       80    Dementia Maternal Grandfather  Heart Disease Paternal Grandmother       [de-identified]    Cancer Paternal Grandfather      oral CA pipe smoker,  80's    Depression Maternal Aunt      2 aunts    Thyroid Disease Maternal Aunt      2 aunts    Depression Maternal Uncle     Attention Deficit Hyperactivity Disorder Son 8     and Dyslexia    No Known Problems Son          VITALS     Visit Vitals    /74 (BP 1 Location: Left arm, BP Patient Position: Sitting)    Pulse 81    Temp 98.3 °F (36.8 °C) (Oral)    Resp 18    Ht 5' 8\" (1.727 m)    Wt 208 lb 3.2 oz (94.4 kg)    LMP 2018    SpO2 98%    BMI 31.66 kg/m2          PHYSICAL EXAMINATION     Physical Exam   Constitutional: She is well-developed, well-nourished, and in no distress. Cardiovascular: Normal rate, regular rhythm and normal heart sounds. No murmur heard. Pulmonary/Chest: Effort normal and breath sounds normal.   Psychiatric: Mood and affect normal.   Vitals reviewed.          DIAGNOSTIC DATA         LABORATORY DATA       Lab Results  Component Value Date/Time   Cholesterol, total 187 2018 09:00 AM   HDL Cholesterol 50 2018 09:00 AM   LDL, calculated 113 (H) 2018 09:00 AM   Triglyceride 121 2018 09:00 AM     Lab Results  Component Value Date/Time   TSH 2.400 2018 09:00 AM   T4, Free 1.20 2018 09:00 AM      Lab Results   Component Value Date/Time    Glucose 92 2018 09:00 AM      Lab Results   Component Value Date/Time    Hemoglobin A1c 5.1 2018 09:00 AM          ASSESSMENT & PLAN       ICD-10-CM ICD-9-CM    1. Impaired fasting glucose R73.01 790.21    2.  Mild hypercholesterolemia E78.00 272.0      Fasting labs from 20 r/w pt (copied above) and comparisons to the labs she had done  for employee biometric screening  Cardiovascular risk and specific lipid/LDL/BS/HgBA1c goals reviewed  Reviewed diet, nutrition, exercise, weight management, BMI/goals, age/risk based screening recommendations, health maintenance & prevention counseling. Commended on her progress thus far. RTC 1-2019 for fasting CPE.  Follow up sooner prn

## 2018-08-20 NOTE — PROGRESS NOTES
Chief Complaint   Patient presents with    Blood sugar problem     had blood work done last week             1. Have you been to the ER, urgent care clinic since your last visit? Hospitalized since your last visit? No    2. Have you seen or consulted any other health care providers outside of the [address] [place] [address] since your last visit? Include any pap smears or colon screening.  No

## 2018-08-20 NOTE — PATIENT INSTRUCTIONS
Body Mass Index: Care Instructions  Your Care Instructions    Body mass index (BMI) can help you see if your weight is raising your risk for health problems. It uses a formula to compare how much you weigh with how tall you are. · A BMI lower than 18.5 is considered underweight. · A BMI between 18.5 and 24.9 is considered healthy. · A BMI between 25 and 29.9 is considered overweight. A BMI of 30 or higher is considered obese. If your BMI is in the normal range, it means that you have a lower risk for weight-related health problems. If your BMI is in the overweight or obese range, you may be at increased risk for weight-related health problems, such as high blood pressure, heart disease, stroke, arthritis or joint pain, and diabetes. If your BMI is in the underweight range, you may be at increased risk for health problems such as fatigue, lower protection (immunity) against illness, muscle loss, bone loss, hair loss, and hormone problems. BMI is just one measure of your risk for weight-related health problems. You may be at higher risk for health problems if you are not active, you eat an unhealthy diet, or you drink too much alcohol or use tobacco products. Follow-up care is a key part of your treatment and safety. Be sure to make and go to all appointments, and call your doctor if you are having problems. It's also a good idea to know your test results and keep a list of the medicines you take. How can you care for yourself at home? · Practice healthy eating habits. This includes eating plenty of fruits, vegetables, whole grains, lean protein, and low-fat dairy. · If your doctor recommends it, get more exercise. Walking is a good choice. Bit by bit, increase the amount you walk every day. Try for at least 30 minutes on most days of the week. · Do not smoke. Smoking can increase your risk for health problems. If you need help quitting, talk to your doctor about stop-smoking programs and medicines. These can increase your chances of quitting for good. · Limit alcohol to 2 drinks a day for men and 1 drink a day for women. Too much alcohol can cause health problems. If you have a BMI higher than 25  · Your doctor may do other tests to check your risk for weight-related health problems. This may include measuring the distance around your waist. A waist measurement of more than 40 inches in men or 35 inches in women can increase the risk of weight-related health problems. · Talk with your doctor about steps you can take to stay healthy or improve your health. You may need to make lifestyle changes to lose weight and stay healthy, such as changing your diet and getting regular exercise. If you have a BMI lower than 18.5  · Your doctor may do other tests to check your risk for health problems. · Talk with your doctor about steps you can take to stay healthy or improve your health. You may need to make lifestyle changes to gain or maintain weight and stay healthy, such as getting more healthy foods in your diet and doing exercises to build muscle. Where can you learn more? Go to http://osmin-dustin.info/. Enter S176 in the search box to learn more about \"Body Mass Index: Care Instructions. \"  Current as of: October 13, 2016  Content Version: 11.4  © 6861-1955 [employer]wise[employer]. Care instructions adapted under license by [employer] (which disclaims liability or warranty for this information). If you have questions about a medical condition or this instruction, always ask your healthcare professional. [place] [address] any warranty or liability for your use of this information. Prediabetes: Care Instructions  Your Care Instructions    Prediabetes is a warning sign that you are at risk for getting type 2 diabetes. It means that your blood sugar is higher than it should be. The food you eat turns into sugar, which your body uses for energy.  Normally, an organ called the pancreas makes insulin, which allows the sugar in your blood to get into your body's cells. But when your body can't use insulin the right way, the sugar doesn't move into cells. It stays in your blood instead. This is called insulin resistance. The buildup of sugar in the blood causes prediabetes. The good news is that lifestyle changes may help you get your blood sugar back to normal and help you avoid or delay diabetes. Follow-up care is a key part of your treatment and safety. Be sure to make and go to all appointments, and call your doctor if you are having problems. It's also a good idea to know your test results and keep a list of the medicines you take. How can you care for yourself at home? · Watch your weight. A healthy weight helps your body use insulin properly. · Limit the amount of calories, sweets, and unhealthy fat you eat. Ask your doctor if you should see a dietitian. A registered dietitian can help you create meal plans that fit your lifestyle. · Get at least 30 minutes of exercise on most days of the week. Exercise helps control your blood sugar. It also helps you maintain a healthy weight. Walking is a good choice. You also may want to do other activities, such as running, swimming, cycling, or playing tennis or team sports. · Do not smoke. Smoking can make prediabetes worse. If you need help quitting, talk to your doctor about stop-smoking programs and medicines. These can increase your chances of quitting for good. · If your doctor prescribed medicines, take them exactly as prescribed. Call your doctor if you think you are having a problem with your medicine. You will get more details on the specific medicines your doctor prescribes. When should you call for help? Watch closely for changes in your health, and be sure to contact your doctor if:    · You have any symptoms of diabetes. These may include:  ¨ Being thirsty more often. ¨ Urinating more.   ¨ Being hungrier. ¨ Losing weight. ¨ Being very tired. ¨ Having blurry vision.     · You have a wound that will not heal.     · You have an infection that will not go away.     · You have problems with your blood pressure.     · You want more information about diabetes and how you can keep from getting it. Where can you learn more? Go to http://osmin-dustin.info/. Enter I222 in the search box to learn more about \"Prediabetes: Care Instructions. \"  Current as of: December 7, 2017  Content Version: 11.7  © 7786-8156 [employer]. Care instructions adapted under license by [employer] (which disclaims liability or warranty for this information). If you have questions about a medical condition or this instruction, always ask your healthcare professional. [address] any warranty or liability for your use of this information.

## 2018-11-07 ENCOUNTER — HOSPITAL ENCOUNTER (OUTPATIENT)
Dept: MAMMOGRAPHY | Age: 43
Discharge: HOME | End: 2018-11-07
Attending: SPECIALIST
Payer: COMMERCIAL

## 2018-11-07 DIAGNOSIS — Z12.39 SCREENING BREAST EXAMINATION: ICD-10-CM

## 2018-11-07 PROCEDURE — 77067 SCR MAMMO BI INCL CAD: CPT

## 2019-03-05 DIAGNOSIS — F41.8 DEPRESSION WITH ANXIETY: ICD-10-CM

## 2019-03-05 RX ORDER — DESVENLAFAXINE SUCCINATE 50 MG/1
50 TABLET, EXTENDED RELEASE ORAL DAILY
Qty: 90 TAB | Refills: 3 | Status: SHIPPED | OUTPATIENT
Start: 2019-03-05 | End: 2020-02-20 | Stop reason: SDUPTHER

## 2019-05-13 ENCOUNTER — TELEPHONE (OUTPATIENT)
Dept: FAMILY MEDICINE CLINIC | Age: 44
End: 2019-05-13

## 2019-05-13 DIAGNOSIS — R73.01 IMPAIRED FASTING GLUCOSE: ICD-10-CM

## 2019-05-13 DIAGNOSIS — Z00.00 ROUTINE GENERAL MEDICAL EXAMINATION AT A HEALTH CARE FACILITY: Primary | ICD-10-CM

## 2019-05-13 DIAGNOSIS — E78.1 HIGH TRIGLYCERIDES: ICD-10-CM

## 2019-05-13 DIAGNOSIS — E78.00 MILD HYPERCHOLESTEROLEMIA: ICD-10-CM

## 2019-05-13 NOTE — TELEPHONE ENCOUNTER
Thank you! My physical is scheduled for June 11 at 1:20. Should I do fasting blood work prior to that?

## 2019-05-28 ENCOUNTER — TELEPHONE (OUTPATIENT)
Dept: FAMILY MEDICINE CLINIC | Age: 44
End: 2019-05-28

## 2019-05-28 NOTE — TELEPHONE ENCOUNTER
Regarding: Non-Urgent Medical Question  Contact: [phone]  ----- Message from [address] [place] [address] [address], Generic sent at 5/28/2019 12:33 PM EDT -----    Artur JIMENEZ,    I guess Dr. Jeri Howe will be out on June 11 because I had a call to reschedule my physical appointment. So, I want to change when I get my bloodwork. My new appointment is on June 18. So, would it be okay if I come to have blood drawn on June 13?     Thanks,  [employer]      Let pt know via [employer]

## 2019-07-26 DIAGNOSIS — E78.00 MILD HYPERCHOLESTEROLEMIA: ICD-10-CM

## 2019-07-26 DIAGNOSIS — R73.01 IMPAIRED FASTING GLUCOSE: ICD-10-CM

## 2019-07-26 DIAGNOSIS — Z00.00 ROUTINE GENERAL MEDICAL EXAMINATION AT A HEALTH CARE FACILITY: ICD-10-CM

## 2019-07-26 DIAGNOSIS — E78.1 HIGH TRIGLYCERIDES: ICD-10-CM

## 2019-07-27 LAB
ALBUMIN SERPL-MCNC: 4.5 G/DL (ref 3.5–5.5)
ALBUMIN/GLOB SERPL: 2 {RATIO} (ref 1.2–2.2)
ALP SERPL-CCNC: 57 IU/L (ref 39–117)
ALT SERPL-CCNC: 27 IU/L (ref 0–32)
AST SERPL-CCNC: 18 IU/L (ref 0–40)
BILIRUB SERPL-MCNC: 0.5 MG/DL (ref 0–1.2)
BUN SERPL-MCNC: 10 MG/DL (ref 6–24)
BUN/CREAT SERPL: 18 (ref 9–23)
CALCIUM SERPL-MCNC: 9.4 MG/DL (ref 8.7–10.2)
CHLORIDE SERPL-SCNC: 102 MMOL/L (ref 96–106)
CHOLEST SERPL-MCNC: 190 MG/DL (ref 100–199)
CO2 SERPL-SCNC: 24 MMOL/L (ref 20–29)
CREAT SERPL-MCNC: 0.56 MG/DL (ref 0.57–1)
ERYTHROCYTE [DISTWIDTH] IN BLOOD BY AUTOMATED COUNT: 14 % (ref 12.3–15.4)
EST. AVERAGE GLUCOSE BLD GHB EST-MCNC: 103 MG/DL
GLOBULIN SER CALC-MCNC: 2.3 G/DL (ref 1.5–4.5)
GLUCOSE SERPL-MCNC: 98 MG/DL (ref 65–99)
HBA1C MFR BLD: 5.2 % (ref 4.8–5.6)
HCT VFR BLD AUTO: 42.3 % (ref 34–46.6)
HDLC SERPL-MCNC: 41 MG/DL
HGB BLD-MCNC: 13.2 G/DL (ref 11.1–15.9)
INTERPRETATION, 910389: NORMAL
LDLC SERPL CALC-MCNC: 123 MG/DL (ref 0–99)
MCH RBC QN AUTO: 28.6 PG (ref 26.6–33)
MCHC RBC AUTO-ENTMCNC: 31.2 G/DL (ref 31.5–35.7)
MCV RBC AUTO: 92 FL (ref 79–97)
PLATELET # BLD AUTO: 286 X10E3/UL (ref 150–450)
POTASSIUM SERPL-SCNC: 4.4 MMOL/L (ref 3.5–5.2)
PROT SERPL-MCNC: 6.8 G/DL (ref 6–8.5)
RBC # BLD AUTO: 4.61 X10E6/UL (ref 3.77–5.28)
SODIUM SERPL-SCNC: 140 MMOL/L (ref 134–144)
TRIGL SERPL-MCNC: 132 MG/DL (ref 0–149)
TSH SERPL DL<=0.005 MIU/L-ACNC: 1.9 UIU/ML (ref 0.45–4.5)
VLDLC SERPL CALC-MCNC: 26 MG/DL (ref 5–40)
WBC # BLD AUTO: 3.2 X10E3/UL (ref 3.4–10.8)

## 2019-07-29 ENCOUNTER — OFFICE VISIT (OUTPATIENT)
Dept: FAMILY MEDICINE CLINIC | Age: 44
End: 2019-07-29

## 2019-07-29 VITALS
OXYGEN SATURATION: 99 % | WEIGHT: 202.2 LBS | HEART RATE: 90 BPM | BODY MASS INDEX: 30.65 KG/M2 | DIASTOLIC BLOOD PRESSURE: 76 MMHG | TEMPERATURE: 98.2 F | RESPIRATION RATE: 17 BRPM | HEIGHT: 68 IN | SYSTOLIC BLOOD PRESSURE: 114 MMHG

## 2019-07-29 DIAGNOSIS — R73.01 IMPAIRED FASTING GLUCOSE: ICD-10-CM

## 2019-07-29 DIAGNOSIS — E78.00 MILD HYPERCHOLESTEROLEMIA: ICD-10-CM

## 2019-07-29 DIAGNOSIS — Z00.00 ROUTINE GENERAL MEDICAL EXAMINATION AT A HEALTH CARE FACILITY: Primary | ICD-10-CM

## 2019-07-29 DIAGNOSIS — F41.8 DEPRESSION WITH ANXIETY: ICD-10-CM

## 2019-07-29 RX ORDER — MINERAL OIL
180 ENEMA (ML) RECTAL
COMMUNITY
Start: 2019-05-08 | End: 2020-05-07

## 2019-07-29 RX ORDER — FLUTICASONE PROPIONATE 50 MCG
2 SPRAY, SUSPENSION (ML) NASAL
COMMUNITY

## 2019-07-29 NOTE — PROGRESS NOTES
Carmen Anaya is a 41 y.o. female    Chief Complaint   Patient presents with    Complete Physical     1. Have you been to the ER, urgent care clinic since your last visit? Hospitalized since your last visit? No    2. Have you seen or consulted any other health care providers outside of the [address] [place] [address] since your last visit? Include any pap smears or colon screening.  No      Visit Vitals  /76 (BP 1 Location: Left arm, BP Patient Position: Sitting)   Pulse 90   Temp 98.2 °F (36.8 °C) (Oral)   Resp 17   Ht 5' 8\" (1.727 m)   Wt 202 lb 3.2 oz (91.7 kg)   SpO2 99%   BMI 30.74 kg/m²

## 2019-07-29 NOTE — PATIENT INSTRUCTIONS
Well Visit, Ages 25 to 48: Care Instructions  Your Care Instructions    Physical exams can help you stay healthy. Your doctor has checked your overall health and may have suggested ways to take good care of yourself. He or she also may have recommended tests. At home, you can help prevent illness with healthy eating, regular exercise, and other steps. Follow-up care is a key part of your treatment and safety. Be sure to make and go to all appointments, and call your doctor if you are having problems. It's also a good idea to know your test results and keep a list of the medicines you take. How can you care for yourself at home? · Reach and stay at a healthy weight. This will lower your risk for many problems, such as obesity, diabetes, heart disease, and high blood pressure. · Get at least 30 minutes of physical activity on most days of the week. Walking is a good choice. You also may want to do other activities, such as running, swimming, cycling, or playing tennis or team sports. Discuss any changes in your exercise program with your doctor. · Do not smoke or allow others to smoke around you. If you need help quitting, talk to your doctor about stop-smoking programs and medicines. These can increase your chances of quitting for good. · Talk to your doctor about whether you have any risk factors for sexually transmitted infections (STIs). Having one sex partner (who does not have STIs and does not have sex with anyone else) is a good way to avoid these infections. · Use birth control if you do not want to have children at this time. Talk with your doctor about the choices available and what might be best for you. · Protect your skin from too much sun. When you're outdoors from 10 a.m. to 4 p.m., stay in the shade or cover up with clothing and a hat with a wide brim. Wear sunglasses that block UV rays. Even when it's cloudy, put broad-spectrum sunscreen (SPF 30 or higher) on any exposed skin.   · See a dentist one or two times a year for checkups and to have your teeth cleaned. · Wear a seat belt in the car. Follow your doctor's advice about when to have certain tests. These tests can spot problems early. For everyone  · Cholesterol. Have the fat (cholesterol) in your blood tested after age 21. Your doctor will tell you how often to have this done based on your age, family history, or other things that can increase your risk for heart disease. · Blood pressure. Have your blood pressure checked during a routine doctor visit. Your doctor will tell you how often to check your blood pressure based on your age, your blood pressure results, and other factors. · Vision. Talk with your doctor about how often to have a glaucoma test.  · Diabetes. Ask your doctor whether you should have tests for diabetes. · Colon cancer. Your risk for colorectal cancer gets higher as you get older. Some experts say that adults should start regular screening at age 48 and stop at age 76. Others say to start before age 48 or continue after age 76. Talk with your doctor about your risk and when to start and stop screening. For women  · Breast exam and mammogram. Talk to your doctor about when you should have a clinical breast exam and a mammogram. Medical experts differ on whether and how often women under 50 should have these tests. Your doctor can help you decide what is right for you. · Cervical cancer screening test and pelvic exam. Begin with a Pap test at age 24. The test often is part of a pelvic exam. Starting at age 27, you may choose to have a Pap test, an HPV test, or both tests at the same time (called co-testing). Talk with your doctor about how often to have testing. · Tests for sexually transmitted infections (STIs). Ask whether you should have tests for STIs. You may be at risk if you have sex with more than one person, especially if your partners do not wear condoms.   For men  · Tests for sexually transmitted infections (STIs). Ask whether you should have tests for STIs. You may be at risk if you have sex with more than one person, especially if you do not wear a condom. · Testicular cancer exam. Ask your doctor whether you should check your testicles regularly. · Prostate exam. Talk to your doctor about whether you should have a blood test (called a PSA test) for prostate cancer. Experts differ on whether and when men should have this test. Some experts suggest it if you are older than 39 and are -American or have a father or brother who got prostate cancer when he was younger than 72. When should you call for help? Watch closely for changes in your health, and be sure to contact your doctor if you have any problems or symptoms that concern you. Where can you learn more? Go to http://osmin-dustin.info/. Enter P072 in the search box to learn more about \"Well Visit, Ages 25 to 48: Care Instructions. \"  Current as of: December 13, 2018  Content Version: 12.1  © 8797-3741 [employer]. Care instructions adapted under license by [employer] (which disclaims liability or warranty for this information). If you have questions about a medical condition or this instruction, always ask your healthcare professional. [place] [address] any warranty or liability for your use of this information.

## 2019-07-29 NOTE — PROGRESS NOTES
Chief Complaint   Patient presents with    Complete Physical     had fasting labs done 7-26-19     HISTORY OF PRESENT ILLNESS   HPI  Annual CPE. Had fasting labs done 7-26-19. Special Diet Yes    Comment: since 6-10-19: [employer] Me[employer] Pan: 3 meals a day, no snacks, no sugars, no flour   Exercise Yes    Comment: 3 x a week: elliptical x 40 minutes   Overall has been getting along well and feeling well in general.  Mood good on Pristiq. REVIEW OF SYMPTOMS   Review of Systems   Constitutional: Negative. Negative for chills, fever, malaise/fatigue and weight loss. HENT: Negative. Respiratory: Negative. Cardiovascular: Negative. Gastrointestinal: Negative. Genitourinary: Negative. Regular menstrual cycles   Musculoskeletal: Negative. Neurological: Negative. Psychiatric/Behavioral: Negative.          Doing well on Pristiq       PROBLEM LIST/MEDICAL HISTORY     Problem List  Date Reviewed: 7/29/2019          Codes Class Noted    Mild hypercholesterolemia ICD-10-CM: E78.00  ICD-9-CM: 272.0  8/20/2018        Impaired fasting glucose ICD-10-CM: R73.01  ICD-9-CM: 790.21  8/8/2018    Overview Signed 8/8/2018 10:06 PM by Henrique Valadez MD     [phone]             Migraine with aura and without status migrainosus, not intractable ICD-10-CM: G43.109  ICD-9-CM: 346.00  1/10/2018    Overview Signed 1/10/2018 11:43 AM by Henrique Valadez MD     Childhood, 20's, 42; visual aura; no neuro sx             Patellofemoral stress syndrome of right knee ICD-10-CM: M22.2X1  ICD-9-CM: 719.46  6/7/2017        Tear of medial meniscus of right knee, current ICD-10-CM: G88.088Q  ICD-9-CM: 836.0  6/7/2017        Mild Thyromegaly w/o cysts or nodules and without obstructive symptoms ICD-10-CM: E01.0  ICD-9-CM: 240.9  12/12/2016    Overview Addendum 1/10/2018 11:36 AM by Henrique Valadez MD      : Mildly enlarged left thyroid lobe             Vitamin D deficiency ICD-10-CM: E55.9  ICD-9-CM: 268.9  2014    Overview Signed 2014  1:33 PM by Shirley Kovacs MD                  Mildly Elevated Triglycerides ICD-10-CM: E78.1  ICD-9-CM: 272.1  2014        Degenerative arthritis of great toe w/ bone spurs ICD-10-CM: M19.079  ICD-9-CM: 715.97  2014    Overview Signed 2014  2:06 PM by Shirley Kovacs MD     S/p CSI of toe per foot doctor; surgery is recommended              Breast cyst ICD-10-CM: N60.09  ICD-9-CM: 610.0  Unknown    Overview Signed 3/31/2010  3:43 PM by Shirley Kovacs MD     Excised R Breast : Dr Jordy Gunter             Seasonal allergic rhinitis ICD-10-CM: J30.2  ICD-9-CM: 477.9  Unknown    Overview Signed 3/31/2010  3:44 PM by Shirley Kovacs MD     Spring/fall             Depression with anxiety ICD-10-CM: F41.8  ICD-9-CM: 300.4  Unknown        Postpartum depression ICD-10-CM: O99.345, F53.0  ICD-9-CM: 648.44, 311  2009    Overview Addendum 2014  1:34 PM by Shirley Kovacs MD     3/18/09  @ 38 wks fetal distress  3/2013 treated again for post partum             C section ICD-9-CM: V45.89  3/18/2009    Overview Signed 3/31/2010  3:41 PM by Shirley Kovacs MD     GYN Dr. Martina Green; Fetal distress                       PAST SURGICAL HISTORY     Past Surgical History:   Procedure Laterality Date    BREAST SURGERY PROCEDURE UNLISTED       breast right cyst benign    HX BREAST BIOPSY Right     neg    HX  SECTION  2009    HX  SECTION  3/17/2013    HX LAP CHOLECYSTECTOMY  13    Dr Taylor Sandoval for gallstones    HX ORTHOPAEDIC Right 2014    cheilectomy; right foot, spur, arthritis       MEDICATIONS     Current Outpatient Medications   Medication Sig    fluticasone propionate (FLONASE) 50 mcg/actuation nasal spray 2 Sprays by Nasal route.  fexofenadine (ALLEGRA) 180 mg tablet Take 180 mg by mouth.     desvenlafaxine succinate (PRISTIQ) 50 mg ER tablet TAKE 1 TAB BY MOUTH DAILY.  multivitamin (ONE A DAY) tablet Take 1 Tab by mouth daily. No current facility-administered medications for this visit.          ALLERGIES     Allergies   Allergen Reactions    Adhesive Rash and Itching    Sulfa (Sulfonamide Antibiotics) Nausea Only and Other (comments)     dizziness    Wellbutrin [Bupropion Hcl] Other (comments)     Increased agitation, not effective for mood        SOCIAL HISTORY     Social History     Socioeconomic History    Marital status:      Spouse name: Not on file    Number of children: 2    Years of education: Not on file    Highest education level: Not on file   Occupational History    Occupation: Teacher at [employer], full time     Employer: [employer]   Tobacco Use    Smoking status: Never Smoker    Smokeless tobacco: Never Used   Substance and Sexual Activity    Alcohol use: Yes     Comment: socially a few x a month, occ    Drug use: No    Sexual activity: Yes     Partners: Male     Birth control/protection: Condom     Comment: Stopped her BCP 6/2013 d/t fluid retention   Other Topics Concern    Caffeine Concern No     Comment: 1 cup of coffee a day    Weight Concern No    Special Diet Yes     Comment: since 6-10-19: [employer] Me[employer] Pan: 3 meals a day, no snacks, no sugars, no flour    Exercise Yes     Comment: 3 x a week: elliptical x 40 minutes      IMMUNIZATIONS  Immunization History   Administered Date(s) Administered    Influenza Vaccine 10/15/2015, 10/20/2016, 12/30/2017    Influenza Vaccine (Quad) Mdck Pf 12/30/2017    Influenza Vaccine (Quad) PF 02/05/2015, 10/28/2018    TD Vaccine 04/04/2011       FAMILY HISTORY     Family History   Problem Relation Age of Onset    High Cholesterol Mother     Hypertension Mother     Elevated Lipids Mother     Depression Mother         Zoloft    Thyroid Disease Mother     Liver Disease Mother 79        autoimmune hepatitis    Asthma Father     Heart Disease Father         cardiomegaly    Other Brother         good health    Other Sister         good health    Stroke Maternal Grandmother          [de-identified]    Heart Disease Maternal Grandmother     Depression Maternal Grandmother     Heart Disease Maternal Grandfather          80    Dementia Maternal Grandfather     Heart Disease Paternal Grandmother          [de-identified]    Cancer Paternal Grandfather         oral CA pipe smoker,  80's    Depression Maternal Aunt         2 aunts    Thyroid Disease Maternal Aunt         2 aunts    Depression Maternal Uncle     Attention Deficit Hyperactivity Disorder Son 8        and Dyslexia    No Known Problems Son          VITALS     Visit Vitals  /76 (BP 1 Location: Left arm, BP Patient Position: Sitting)   Pulse 90   Temp 98.2 °F (36.8 °C) (Oral)   Resp 17   Ht 5' 8\" (1.727 m)   Wt 202 lb 3.2 oz (91.7 kg)   LMP 2019   SpO2 99%   BMI 30.74 kg/m²          PHYSICAL EXAMINATION   Physical Exam   Constitutional: She is oriented to person, place, and time and well-developed, well-nourished, and in no distress. HENT:   Right Ear: Tympanic membrane normal.   Left Ear: Tympanic membrane normal.   Nose: Nose normal.   Mouth/Throat: Oropharynx is clear and moist. No oropharyngeal exudate. Eyes: Pupils are equal, round, and reactive to light. Conjunctivae and EOM are normal.   Cardiovascular: Normal rate, regular rhythm and normal heart sounds. No murmur heard. Pulmonary/Chest: Effort normal and breath sounds normal.   Abdominal: Soft. Bowel sounds are normal. She exhibits no distension and no mass. There is no tenderness. Musculoskeletal: Normal range of motion. She exhibits no edema or tenderness. Lymphadenopathy:     She has no cervical adenopathy. Neurological: She is alert and oriented to person, place, and time. Gait normal.   Skin: Skin is warm and dry. Psychiatric: Mood and affect normal.   Vitals reviewed.           DIAGNOSTIC DATA LABORATORY DATA     Results for orders placed or performed in visit on 07/26/19   HEMOGLOBIN A1C WITH EAG   Result Value Ref Range    Hemoglobin A1c 5.2 4.8 - 5.6 %    Estimated average glucose 103 mg/dL   TSH 3RD GENERATION   Result Value Ref Range    TSH 1.900 0.450 - 4.500 uIU/mL   CBC W/O DIFF   Result Value Ref Range    WBC 3.2 (L) 3.4 - 10.8 x10E3/uL    RBC 4.61 3.77 - 5.28 x10E6/uL    HGB 13.2 11.1 - 15.9 g/dL    HCT 42.3 34.0 - 46.6 %    MCV 92 79 - 97 fL    MCH 28.6 26.6 - 33.0 pg    MCHC 31.2 (L) 31.5 - 35.7 g/dL    RDW 14.0 12.3 - 15.4 %    PLATELET 588 283 - 004 x10E3/uL   LIPID PANEL   Result Value Ref Range    Cholesterol, total 190 100 - 199 mg/dL    Triglyceride 132 0 - 149 mg/dL    HDL Cholesterol 41 >39 mg/dL    VLDL, calculated 26 5 - 40 mg/dL    LDL, calculated 123 (H) 0 - 99 mg/dL   METABOLIC PANEL, COMPREHENSIVE   Result Value Ref Range    Glucose 98 65 - 99 mg/dL    BUN 10 6 - 24 mg/dL    Creatinine 0.56 (L) 0.57 - 1.00 mg/dL    GFR est non- >59 mL/min/1.73    GFR est  >59 mL/min/1.73    BUN/Creatinine ratio 18 9 - 23    Sodium 140 134 - 144 mmol/L    Potassium 4.4 3.5 - 5.2 mmol/L    Chloride 102 96 - 106 mmol/L    CO2 24 20 - 29 mmol/L    Calcium 9.4 8.7 - 10.2 mg/dL    Protein, total 6.8 6.0 - 8.5 g/dL    Albumin 4.5 3.5 - 5.5 g/dL    GLOBULIN, TOTAL 2.3 1.5 - 4.5 g/dL    A-G Ratio 2.0 1.2 - 2.2    Bilirubin, total 0.5 0.0 - 1.2 mg/dL    Alk. phosphatase 57 39 - 117 IU/L    AST (SGOT) 18 0 - 40 IU/L    ALT (SGPT) 27 0 - 32 IU/L   CVD REPORT   Result Value Ref Range    INTERPRETATION Note        Lab Results   Component Value Date/Time    Hemoglobin A1c 5.2 07/26/2019 08:39 AM    Hemoglobin A1c 5.1 08/13/2018 09:00 AM    Glucose 98 07/26/2019 08:39 AM    LDL, calculated 123 (H) 07/26/2019 08:39 AM    Creatinine 0.56 (L) 07/26/2019 08:39 AM          ASSESSMENT & PLAN       ICD-10-CM ICD-9-CM    1. Routine general medical examination at a health care facility Z00.00 V70.0    2.  Mild hypercholesterolemia E78.00 272.0    3. Impaired fasting glucose R73.01 790.21    4. Depression with anxiety F41.8 300.4      Fasting lab results from 7-26-19 (copied above) reviewed with patient  Cardiovascular risk and specific lipid/LDL/BS/HgBA1c goals reviewed  WBC mildly decreased. This has been even lower in the past and when re-evaluated and retested using citrated tube, count normalized thus reading presumed falsely low due to clumping of cells. Recent reading marginal and pt w/o any constitutional or other sx. Will continue to monitor routinely and prn any clinical changes or concerns  Reviewed diet, nutrition, exercise, weight management, BMI/goals, age/risk based screening recommendations, health maintenance & prevention counseling. Cancer screening USPTFS guidelines reviewed w/ pt today. Discussed benefits/positive/negative outcomes of screening based on age/risk stratification. Informed consent for/against screening based on pt's personal hx/risk factors. Updated in history above and health maintenance. Sees gyn annually for well woman visits/gyn exams and pap screenings, reportedly all normal  and scheduled for annual visit this October.   RTC 1 yr for next annual checkup, follow up sooner prn

## 2019-11-08 ENCOUNTER — HOSPITAL ENCOUNTER (OUTPATIENT)
Dept: MAMMOGRAPHY | Age: 44
Discharge: HOME | End: 2019-11-08
Attending: SPECIALIST
Payer: COMMERCIAL

## 2019-11-08 DIAGNOSIS — Z12.31 VISIT FOR SCREENING MAMMOGRAM: ICD-10-CM

## 2019-11-08 PROCEDURE — 77067 SCR MAMMO BI INCL CAD: CPT

## 2020-02-20 ENCOUNTER — OFFICE VISIT (OUTPATIENT)
Dept: FAMILY MEDICINE CLINIC | Age: 45
End: 2020-02-20

## 2020-02-20 VITALS
RESPIRATION RATE: 16 BRPM | HEART RATE: 82 BPM | BODY MASS INDEX: 31.61 KG/M2 | OXYGEN SATURATION: 98 % | DIASTOLIC BLOOD PRESSURE: 68 MMHG | TEMPERATURE: 98.4 F | WEIGHT: 208.6 LBS | HEIGHT: 68 IN | SYSTOLIC BLOOD PRESSURE: 118 MMHG

## 2020-02-20 DIAGNOSIS — K12.0 CANKER SORE: ICD-10-CM

## 2020-02-20 DIAGNOSIS — J02.9 SORE THROAT: Primary | ICD-10-CM

## 2020-02-20 DIAGNOSIS — F41.8 DEPRESSION WITH ANXIETY: ICD-10-CM

## 2020-02-20 LAB
S PYO AG THROAT QL: NEGATIVE
VALID INTERNAL CONTROL?: YES

## 2020-02-20 RX ORDER — DESVENLAFAXINE SUCCINATE 50 MG/1
50 TABLET, EXTENDED RELEASE ORAL DAILY
Qty: 90 TAB | Refills: 3 | Status: SHIPPED | OUTPATIENT
Start: 2020-02-20 | End: 2020-07-22

## 2020-02-20 NOTE — PROGRESS NOTES
Chief Complaint   Patient presents with    Sore Throat     x 3 days    Mouth Lesions     canker sore right side of tongue x 3 days       HISTORY OF PRESENT ILLNESS   HPI  Patient started 1 week ago w/ a canker sore inner border of upper lip. It lasted 4-5 days and went away on its own. Another developed on right side of her tongue 3 days ago. Now the right side of the back of her throat hurts and radiates into right ear. Right anterior neck feels sore and tender. No swelling. Hurts to talk or eat anything other than soft foods. Drinking fluids fine. She has had some minor congestion, generalized achiness and feeling a bit run down. 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 She denies fevers, white exudate or swollen glands. Took 400 mg Ibuprofen this AM ~ 7 hrs prior to today's exam.   She is prone towards canker sores every now and then. REVIEW OF SYMPTOMS   Review of Systems   Eyes: Negative. Respiratory: Negative. Cardiovascular: Negative. Gastrointestinal: Negative. Neurological: Negative. Psychiatric/Behavioral: Negative.          Doing well on Pristiq, mood good and stable, needs refills           PROBLEM LIST/MEDICAL HISTORY     Problem List  Date Reviewed: 2/20/2020          Codes Class Noted    Mild hypercholesterolemia ICD-10-CM: E78.00  ICD-9-CM: 272.0  8/20/2018        Impaired fasting glucose ICD-10-CM: R73.01  ICD-9-CM: 790.21  8/8/2018    Overview Signed 8/8/2018 10:06 PM by Teena Tran MD     [id number]             Migraine with aura and without status migrainosus, not intractable ICD-10-CM: G43.109  ICD-9-CM: 346.00  1/10/2018    Overview Signed 1/10/2018 11:43 AM by Teena Tran MD     Childhood, 20's, 42; visual aura; no neuro sx             Patellofemoral stress syndrome of right knee ICD-10-CM: M22.2X1  ICD-9-CM: 719.46  2017        Tear of medial meniscus of right knee, current ICD-10-CM: F69.259Z  ICD-9-CM: 836.0  2017        Mild Thyromegaly w/o cysts or nodules and without obstructive symptoms ICD-10-CM: E01.0  ICD-9-CM: 240.9  2016    Overview Addendum 1/10/2018 11:36 AM by Hansa Ramirez MD      : Mildly enlarged left thyroid lobe             Vitamin D deficiency ICD-10-CM: E55.9  ICD-9-CM: 268.9  2014    Overview Signed 2014  1:33 PM by Hansa Ramirez MD                  Mildly Elevated Triglycerides ICD-10-CM: E78.1  ICD-9-CM: 272.1  2014        Degenerative arthritis of great toe w/ bone spurs ICD-10-CM: M19.079  ICD-9-CM: 715.97  2014    Overview Signed 2014  2:06 PM by Hansa Ramirez MD     S/p CSI of toe per foot doctor; surgery is recommended              Breast cyst ICD-10-CM: N60.09  ICD-9-CM: 610.0  Unknown    Overview Signed 3/31/2010  3:43 PM by Hansa Ramirez MD     Excised R Breast : Dr oRmel Patterson             Seasonal allergic rhinitis ICD-10-CM: J30.2  ICD-9-CM: 477.9  Unknown    Overview Signed 3/31/2010  3:44 PM by Hansa Ramirez MD     Spring/             Depression with anxiety ICD-10-CM: F41.8  ICD-9-CM: 300.4  Unknown        Postpartum depression ICD-10-CM: O99.345, F53.0  ICD-9-CM: 648.44, 311  2009    Overview Addendum 2014  1:34 PM by Hansa Ramirez MD     3/18/09  @ 38 wks fetal distress  3/2013 treated again for post partum             C section ICD-9-CM: V45.89  3/18/2009    Overview Signed 3/31/2010  3:41 PM by Hansa Ramirez MD     GYN Dr. Jacy Lucas;  Fetal distress                       PAST SURGICAL HISTORY     Past Surgical History:   Procedure Laterality Date    BREAST SURGERY PROCEDURE UNLISTED  2009     breast right cyst benign    HX BREAST BIOPSY Right 2009    neg    HX  SECTION  2009    HX  SECTION  3/17/2013    HX LAP CHOLECYSTECTOMY  13    Dr Layla Vargas for gallstones    HX ORTHOPAEDIC Right 2014    cheilectomy; right foot, spur, arthritis         MEDICATIONS     Current Outpatient Medications   Medication Sig    fluticasone propionate (FLONASE) 50 mcg/actuation nasal spray 2 Sprays by Nasal route.  fexofenadine (ALLEGRA) 180 mg tablet Take 180 mg by mouth.  desvenlafaxine succinate (PRISTIQ) 50 mg ER tablet TAKE 1 TAB BY MOUTH DAILY.  multivitamin (ONE A DAY) tablet Take 1 Tab by mouth daily. No current facility-administered medications for this visit.            ALLERGIES     Allergies   Allergen Reactions    Adhesive Rash and Itching    Sulfa (Sulfonamide Antibiotics) Nausea Only and Other (comments)     dizziness    Wellbutrin [Bupropion Hcl] Other (comments)     Increased agitation, not effective for mood          SOCIAL HISTORY     Social History     Socioeconomic History    Marital status:      Spouse name: Not on file    Number of children: 2    Years of education: Not on file    Highest education level: Not on file   Occupational History    Occupation: Teacher at [employer], full time     Employer: [employer]   Tobacco Use    Smoking status: Never Smoker    Smokeless tobacco: Never Used   Substance and Sexual Activity    Alcohol use: Yes     Comment: socially a few x a month, occ    Drug use: No    Sexual activity: Yes     Partners: Male     Birth control/protection: Condom     Comment: Stopped her BCP 2013 d/t fluid retention   Other Topics Concern    Caffeine Concern No     Comment: 1 cup of coffee a day    Weight Concern No    Special Diet Yes     Comment: since 6-10-19: [employer]: 3 meals a day, no snacks, no sugars, no flour    Exercise Yes     Comment: 3 x a week: elliptical x 40 minutes        IMMUNIZATIONS  Immunization History   Administered Date(s) Administered    Influenza Vaccine 10/15/2015, 10/20/2016, 2017, 2019    Influenza Vaccine (Quad) Mdck Pf 2017    Influenza Vaccine (Quad) PF 2015, 10/28/2018, 2019    TD Vaccine 2011         FAMILY HISTORY     Family History   Problem Relation Age of Onset    High Cholesterol Mother     Hypertension Mother     Elevated Lipids Mother     Depression Mother         Zoloft    Thyroid Disease Mother     Liver Disease Mother 79        autoimmune hepatitis    Asthma Father     Heart Disease Father         cardiomegaly    Other Brother         good health    Other Sister         good health    Stroke Maternal Grandmother          [de-identified]    Heart Disease Maternal Grandmother     Depression Maternal Grandmother     Heart Disease Maternal Grandfather          80    Dementia Maternal Grandfather     Heart Disease Paternal Grandmother          [de-identified]    Cancer Paternal Grandfather         oral CA pipe smoker,  80's    Depression Maternal Aunt         2 aunts    Thyroid Disease Maternal Aunt         2 aunts    Depression Maternal Uncle     Attention Deficit Hyperactivity Disorder Son 8        and Dyslexia    No Known Problems Son          VITALS     Visit Vitals  /68 (BP 1 Location: Left arm, BP Patient Position: Sitting)   Pulse 82   Temp 98.4 °F (36.9 °C) (Oral)   Resp 16   Ht 5' 8\" (1.727 m)   Wt 208 lb 9.6 oz (94.6 kg)   LMP 2020   SpO2 98%   BMI 31.72 kg/m²          PHYSICAL EXAMINATION   Physical Exam  Vitals signs reviewed. Constitutional:       General: She is not in acute distress. Appearance: She is not ill-appearing. HENT:      Right Ear: Tympanic membrane, ear canal and external ear normal.      Left Ear: Tympanic membrane, ear canal and external ear normal.      Nose: Congestion present. Mouth/Throat:      Mouth: Mucous membranes are moist.      Tongue: Lesions (aphthous ulcer right lateral tongue) present. Pharynx: Oropharynx is clear.  No pharyngeal swelling, oropharyngeal exudate or posterior oropharyngeal erythema. Tonsils: No tonsillar exudate. Eyes:      Conjunctiva/sclera: Conjunctivae normal.   Neck:      Musculoskeletal: Neck supple. Comments: Mildly tender along right anterior neck as shown in illustration but there are no palpable masses. No edema. No adenopathy. No erythema. Cardiovascular:      Rate and Rhythm: Normal rate and regular rhythm. Pulmonary:      Effort: Pulmonary effort is normal. No respiratory distress. Breath sounds: Normal breath sounds. Lymphadenopathy:      Head:      Right side of head: No submental, submandibular, tonsillar or posterior auricular adenopathy. Left side of head: No submental, submandibular, tonsillar or posterior auricular adenopathy. Cervical: No cervical adenopathy. Right cervical: No superficial, deep or posterior cervical adenopathy. Left cervical: No superficial, deep or posterior cervical adenopathy. Skin:     General: Skin is warm. Psychiatric:         Mood and Affect: Mood normal.         Behavior: Behavior normal.         Thought Content:  Thought content normal.         Judgment: Judgment normal.             DIAGNOSTIC DATA         LABORATORY DATA     Results for orders placed or performed in visit on 07/26/19   HEMOGLOBIN A1C WITH EAG   Result Value Ref Range    Hemoglobin A1c 5.2 4.8 - 5.6 %    Estimated average glucose 103 mg/dL   TSH 3RD GENERATION   Result Value Ref Range    TSH 1.900 0.450 - 4.500 uIU/mL   CBC W/O DIFF   Result Value Ref Range    WBC 3.2 (L) 3.4 - 10.8 x10E3/uL    RBC 4.61 3.77 - 5.28 x10E6/uL    HGB 13.2 11.1 - 15.9 g/dL    HCT 42.3 34.0 - 46.6 %    MCV 92 79 - 97 fL    MCH 28.6 26.6 - 33.0 pg    MCHC 31.2 (L) 31.5 - 35.7 g/dL    RDW 14.0 12.3 - 15.4 %    PLATELET 658 549 - 777 x10E3/uL   LIPID PANEL   Result Value Ref Range    Cholesterol, total 190 100 - 199 mg/dL    Triglyceride 132 0 - 149 mg/dL    HDL Cholesterol 41 >39 mg/dL    VLDL, calculated 26 5 - 40 mg/dL    LDL, calculated 123 (H) 0 - 99 mg/dL   METABOLIC PANEL, COMPREHENSIVE   Result Value Ref Range    Glucose 98 65 - 99 mg/dL    BUN 10 6 - 24 mg/dL    Creatinine 0.56 (L) 0.57 - 1.00 mg/dL    GFR est non- >59 mL/min/1.73    GFR est  >59 mL/min/1.73    BUN/Creatinine ratio 18 9 - 23    Sodium 140 134 - 144 mmol/L    Potassium 4.4 3.5 - 5.2 mmol/L    Chloride 102 96 - 106 mmol/L    CO2 24 20 - 29 mmol/L    Calcium 9.4 8.7 - 10.2 mg/dL    Protein, total 6.8 6.0 - 8.5 g/dL    Albumin 4.5 3.5 - 5.5 g/dL    GLOBULIN, TOTAL 2.3 1.5 - 4.5 g/dL    A-G Ratio 2.0 1.2 - 2.2    Bilirubin, total 0.5 0.0 - 1.2 mg/dL    Alk. phosphatase 57 39 - 117 IU/L    AST (SGOT) 18 0 - 40 IU/L    ALT (SGPT) 27 0 - 32 IU/L   CVD REPORT   Result Value Ref Range    INTERPRETATION Note             ASSESSMENT & PLAN       ICD-10-CM ICD-9-CM    1. Sore throat J02.9 462 magic mouthwash solution      AMB POC RAPID STREP A: Negative   2. Canker sore K12.0 528.2 magic mouthwash solution   3. Depression with anxiety, well controlled, stable on Pristiq, Rx mgt F41.8 300.4 desvenlafaxine succinate (PRISTIQ) 50 mg ER tablet     Strep negative. Minimal clinical criteria met for adult strep. Therefore no culture sent and no empiric treatment.   Treat supportively w/ warm salt water gargles, Motrin 600-800 mg tid w/ food as directed  Magic Mouthwash swish, gargle and spit q4hr prn for throat pain and canker sore pain  Preventive measures and home care instructions r/w pt, handouts given  Call back and follow up prn

## 2020-02-20 NOTE — PATIENT INSTRUCTIONS
Canker Sore: Care Instructions Your Care Instructions Canker sores are painful white sores in the mouth. They usually begin with a tingling feeling, followed by a red spot or bump that turns white. Canker sores appear most often on the tongue, inside the cheeks, and inside the lips. They can be very painful and can make talking, eating, and drinking difficult. A canker sore may form after an injury or stretching of tissues in the mouth, which can happen, for example, during a dental procedure or teeth cleaning. If you accidentally bite your tongue or the inside of your cheek, you may end up with a canker sore. Other possible causes are infection, certain foods, and stress. Canker sores are not contagious. The pain from your canker sore should decrease in 7 to 10 days, and it should heal completely in 1 to 3 weeks. In most cases, a canker sore will go away by itself. Home treatment can ease pain and discomfort. If you have a large or deep canker sore that does not seem to be getting better after 2 weeks, your doctor may prescribe medicine. Canker sores often come back again. Follow-up care is a key part of your treatment and safety. Be sure to make and go to all appointments, and call your doctor if you are having problems. It's also a good idea to know your test results and keep a list of the medicines you take. How can you care for yourself at home? · Drink cold liquids, such as water or iced tea, or eat flavored ice pops or frozen juices. Use a straw to keep the liquid from coming in contact with your canker sore. · Eat soft, bland foods that are easy to chew and swallow, such as ice cream, custard, applesauce, cottage cheese, macaroni and cheese, soft-cooked eggs, yogurt, or cream soups. · Cut foods into small pieces, or grind, mash, blend, or puree foods to make them easier to chew and swallow.  
· While your canker sore heals, avoid coffee, chocolate, spicy and salty foods, citrus fruits, nuts, seeds, and tomatoes. · To soothe your canker sore and help it heal: ? Use an over-the-counter numbing medicine, such as Orabase or Anbesol. ? Dab a bit of Milk of Magnesia on the canker sore 3 or 4 times a day. · Put ice on your sore to reduce the pain. · Take anti-inflammatory medicines to reduce pain, as needed. These include ibuprofen (Advil, Motrin) and naproxen (Aleve). Read and follow all instructions on the label. · Use a soft-bristle toothbrush, and brush your teeth well but carefully. · Do not smoke or use spit tobacco. Tobacco can cause mouth problems and slow healing. If you need help quitting, talk to your doctor about stop-smoking programs and medicines. These can increase your chances of quitting for good. When should you call for help? Call your doctor now or seek immediate medical care if: 
  · You have signs of infection, such as: 
? Increased pain, swelling, warmth, or redness. ? Red streaks leading from the area. ? Pus draining from the area. ? A fever.  
 Watch closely for changes in your health, and be sure to contact your doctor if: 
  · You do not get better as expected. Where can you learn more? Go to http://osmin-dustin.info/. Enter D068 in the search box to learn more about \"Canker Sore: Care Instructions. \" Current as of: October 3, 2018 Content Version: 12.2 © 7544-9455 [employer]. Care instructions adapted under license by [employer] (which disclaims liability or warranty for this information). If you have questions about a medical condition or this instruction, always ask your healthcare professional. [address] any warranty or liability for your use of this information.

## 2020-02-20 NOTE — PROGRESS NOTES
Chief Complaint   Patient presents with    Sore Throat     3 days ago     1. Have you been to the ER, urgent care clinic since your last visit? Hospitalized since your last visit? No    2. Have you seen or consulted any other health care providers outside of the [address] [place] [address] since your last visit? Include any pap smears or colon screening.  No

## 2020-03-26 ENCOUNTER — TELEPHONE (OUTPATIENT)
Dept: FAMILY MEDICINE CLINIC | Age: 45
End: 2020-03-26

## 2020-03-26 DIAGNOSIS — F41.8 DEPRESSION WITH ANXIETY: Primary | ICD-10-CM

## 2020-03-26 RX ORDER — DESVENLAFAXINE 25 MG/1
25 TABLET, EXTENDED RELEASE ORAL DAILY
Qty: 90 TAB | Refills: 0 | Status: SHIPPED | OUTPATIENT
Start: 2020-03-26 | End: 2020-07-22 | Stop reason: DRUGHIGH

## 2020-03-26 NOTE — TELEPHONE ENCOUNTER
----- Message from [address] [place] [address]. Gonzalez sent at 3/26/2020  8:54 AM EDT -----  Regarding: RE: Non-Urgent Medical Question: RE: [address]: [phone]  Thanks so much for your informative reply. I would like for you to send the 25mg to [facility] and I will try that. I will also plan to schedule a teledoc appointment with you for 4-6 weeks from now. Many thanks. Stay safe and healthy!

## 2020-06-26 DIAGNOSIS — F41.8 DEPRESSION WITH ANXIETY: ICD-10-CM

## 2020-06-28 NOTE — TELEPHONE ENCOUNTER
Back in March pt sent a message requesting to increase dose, so we temporarily added a 25 mg cap to the 50 mg cap she was already taking and she was going to then see me for a follow up virtual visit ~ 4-6 weeks later. I am now getting a refill request for the 25 mg's but not the 50's. Please get pt scheduled for virtual follow up asap to discuss med.

## 2020-06-30 RX ORDER — DESVENLAFAXINE 25 MG/1
TABLET, EXTENDED RELEASE ORAL
Qty: 30 TAB | Refills: 2 | OUTPATIENT
Start: 2020-06-30

## 2020-06-30 NOTE — TELEPHONE ENCOUNTER
Patient response via my chart. Vaughn Roth   to Me            6/30/20 8:55 AM   Hi there,     I am going to drop down to the 50 mg, like I was previously taking.  I am getting ready to go out of town and will call to make an appointment in a few weeks.      Thanks,  Keira De La Rosa

## 2020-07-22 ENCOUNTER — VIRTUAL VISIT (OUTPATIENT)
Dept: FAMILY MEDICINE CLINIC | Age: 45
End: 2020-07-22

## 2020-07-22 DIAGNOSIS — Z20.822 CLOSE EXPOSURE TO COVID-19 VIRUS: Primary | ICD-10-CM

## 2020-07-22 DIAGNOSIS — F41.8 SITUATIONAL ANXIETY: ICD-10-CM

## 2020-07-22 DIAGNOSIS — F41.8 DEPRESSION WITH ANXIETY: ICD-10-CM

## 2020-07-22 DIAGNOSIS — Z23 NEED FOR DIPHTHERIA-TETANUS-PERTUSSIS (TDAP) VACCINE: ICD-10-CM

## 2020-07-22 RX ORDER — DESVENLAFAXINE 100 MG/1
100 TABLET, EXTENDED RELEASE ORAL DAILY
Qty: 30 TAB | Refills: 1 | Status: SHIPPED | OUTPATIENT
Start: 2020-07-22 | End: 2020-09-21 | Stop reason: SDUPTHER

## 2020-07-22 NOTE — PROGRESS NOTES
VIRTUAL VISIT    Patient seen via virtual visit today for the following:  Chief Complaint   Patient presents with    Concern For COVID-19 (Coronavirus)     Exposure to + contact    Anxiety    Immunization/Injection     Needs Tdap     HISTORY OF PRESENT ILLNESS   HPI  Patient is traveling back from [place] where current Covid cases are very high. She and her  ate at a restaurant there on 20. Two days later the restaurant shut down because their  tested + for Covid. They left [place] this AM and were planning to travel to visit her sister to see her new 1 month old nephew, but now is concerned because of the Covid exposure. She is having a lot of stress and anxiety lately anyway and is not sure if some of the feelings she is having now is nervousness or Covid related. She denies cough, sob, chest pain, sputum, fevers, chills, rhinitis, or GI symptoms. She also needs a Tdap booster because of her  nephew. Her last Td was . She has no h/o vaccine reactions or side effects. She has been on Pristiq 50 mg once daily w/ good control of her depression and up til now the anxiety as well. But lately her anxiety has heightened w/ all that is going on. REVIEW OF SYMPTOMS   Review of Systems   Constitutional: Negative. Negative for chills and fever. HENT: Negative. Respiratory: Negative. Negative for cough, shortness of breath and wheezing. Cardiovascular: Negative. Gastrointestinal: Negative. Psychiatric/Behavioral: The patient is nervous/anxious.             PROBLEM LIST/MEDICAL HISTORY     Problem List  Date Reviewed: 2020          Codes Class Noted    Mild hypercholesterolemia ICD-10-CM: E78.00  ICD-9-CM: 272.0  2018        Impaired fasting glucose ICD-10-CM: R73.01  ICD-9-CM: 790.21  2018    Overview Signed 2018 10:06 PM by Leda Guerrero MD     5-1962             Migraine with aura and without status migrainosus, not intractable ICD-10-CM: J35.258  ICD-9-CM: 346.00  1/10/2018    Overview Signed 1/10/2018 11:43 AM by Beryl Samuel MD     Childhood, 20's, 42; visual aura; no neuro sx             Patellofemoral stress syndrome of right knee ICD-10-CM: M22.2X1  ICD-9-CM: 719.46  2017        Tear of medial meniscus of right knee, current ICD-10-CM: L05.048Q  ICD-9-CM: 836.0  2017        Mild Thyromegaly w/o cysts or nodules and without obstructive symptoms ICD-10-CM: E01.0  ICD-9-CM: 240.9  2016    Overview Addendum 1/10/2018 11:36 AM by Beryl Samuel MD      : Mildly enlarged left thyroid lobe             Vitamin D deficiency ICD-10-CM: E55.9  ICD-9-CM: 268.9  2014    Overview Signed 2014  1:33 PM by Beryl Samuel MD     2011             Mildly Elevated Triglycerides ICD-10-CM: E78.1  ICD-9-CM: 272.1  2014        Degenerative arthritis of great toe w/ bone spurs ICD-10-CM: M19.079  ICD-9-CM: 715.97  2014    Overview Signed 2014  2:06 PM by Beryl Samuel MD     S/p CSI of toe per foot doctor; surgery is recommended              Breast cyst ICD-10-CM: N60.09  ICD-9-CM: 610.0  Unknown    Overview Signed 3/31/2010  3:43 PM by Beryl Samuel MD     Excised R Breast : Dr Jefferson Coil             Seasonal allergic rhinitis ICD-10-CM: J30.2  ICD-9-CM: 477.9  Unknown    Overview Signed 3/31/2010  3:44 PM by Beryl Smauel MD     Spring/fall             Depression with anxiety ICD-10-CM: F41.8  ICD-9-CM: 300.4  Unknown        Postpartum depression ICD-10-CM: O99.345, F53.0  ICD-9-CM: 648.44, 311  2009    Overview Addendum 2014  1:34 PM by Beryl Samuel MD     3/18/09  @ 38 wks fetal distress  3/2013 treated again for post partum             C section ICD-9-CM: V45.89  3/18/2009    Overview Signed 3/31/2010  3:41 PM by Beryl Samuel MD     GYN Dr. Inés Suazo;  Fetal distress                       PAST SURGICAL HISTORY Past Surgical History:   Procedure Laterality Date    BREAST SURGERY PROCEDURE UNLISTED  2009     breast right cyst benign    HX BREAST BIOPSY Right 2009    neg    HX  SECTION  2009    HX  SECTION  3/17/2013    HX LAP CHOLECYSTECTOMY  13    Dr Amanda Chen for gallstones    HX ORTHOPAEDIC Right 2014    cheilectomy; right foot, spur, arthritis         MEDICATIONS     Current Outpatient Medications   Medication Sig    desvenlafaxine succinate (PRISTIQ) 50 mg ER tablet Take 1 Tab by mouth daily.  fluticasone propionate (FLONASE) 50 mcg/actuation nasal spray 2 Sprays by Nasal route.  multivitamin (ONE A DAY) tablet Take 1 Tab by mouth daily. No current facility-administered medications for this visit.            ALLERGIES     Allergies   Allergen Reactions    Adhesive Rash and Itching    Sulfa (Sulfonamide Antibiotics) Nausea Only and Other (comments)     dizziness    Wellbutrin [Bupropion Hcl] Other (comments)     Increased agitation, not effective for mood          SOCIAL HISTORY     Social History     Socioeconomic History    Marital status:      Spouse name: Not on file    Number of children: 2    Years of education: Not on file    Highest education level: Not on file   Occupational History    Occupation: Teacher at [employer], full time     Employer: [employer]   Tobacco Use    Smoking status: Never Smoker    Smokeless tobacco: Never Used   Substance and Sexual Activity    Alcohol use: Yes     Comment: socially a few x a month, occ    Drug use: No    Sexual activity: Yes     Partners: Male     Birth control/protection: Condom     Comment: Stopped her BCP 2013 d/t fluid retention   Other Topics Concern    Caffeine Concern No     Comment: 1 cup of coffee a day    Weight Concern No    Special Diet Yes     Comment: since 6-10-19: [employer] Me[employer] Pan: 3 meals a day, no snacks, no sugars, no flour    Exercise Yes     Comment: 3 x a week: elliptical x 40 minutes        IMMUNIZATIONS  Immunization History   Administered Date(s) Administered    Influenza Vaccine 10/15/2015, 10/20/2016, 2017, 2019    Influenza Vaccine (Quad) Mdck Pf 2017    Influenza Vaccine (Quad) PF 2015, 10/28/2018, 2019    TD Vaccine 2011         FAMILY HISTORY     Family History   Problem Relation Age of Onset    High Cholesterol Mother     Hypertension Mother     Elevated Lipids Mother     Depression Mother         Zoloft    Thyroid Disease Mother     Liver Disease Mother 79        autoimmune hepatitis    Asthma Father     Heart Disease Father         cardiomegaly    Other Brother         good health    Other Sister         good health    Stroke Maternal Grandmother          [de-identified]    Heart Disease Maternal Grandmother     Depression Maternal Grandmother     Heart Disease Maternal Grandfather          80    Dementia Maternal Grandfather     Heart Disease Paternal Grandmother          [de-identified]    Cancer Paternal Grandfather         oral CA pipe smoker,  80's    Depression Maternal Aunt         2 aunts    Thyroid Disease Maternal Aunt         2 aunts    Depression Maternal Uncle     Attention Deficit Hyperactivity Disorder Son 8        and Dyslexia    No Known Problems Son          VITALS   There were no vitals taken for this virtual visit. Patient-Reported Vitals 2020   Patient-Reported Weight 210   Patient-Reported Height 5'8\"   Patient-Reported Temperature 98.6   Patient-Reported LMP 20       PHYSICAL EXAMINATION   Physical Exam  Constitutional:       General: She is not in acute distress. Appearance: Normal appearance. She is not ill-appearing. Pulmonary:      Effort: Pulmonary effort is normal. No respiratory distress. Neurological:      Mental Status: She is oriented to person, place, and time. Mental status is at baseline.    Psychiatric:         Mood and Affect: Mood and affect normal.         Speech: Speech normal.         Behavior: Behavior normal.         Thought Content: Thought content normal.             ASSESSMENT & PLAN       ICD-10-CM ICD-9-CM    1. Close exposure to COVID-19 virus  Z20.828 V01.79 NOVEL CORONAVIRUS (COVID-19)   2. Situational anxiety  F41.8 300.09    3. Depression with anxiety  F41.8 300.4 desvenlafaxine succinate 100 mg Tb24   4. Need for diphtheria-tetanus-pertussis (Tdap) vaccine  Z23 V06.1      Patient will have Covid testing at our office via drive up in our designated parking area. She will otherwise self quarantine until test results are back. After 2 weeks, she may have nurse visit for the Tdap vaccine. Increase Pristiq to 100 mg once daily  Follow up w/ me in 4 weeks to reassess, call back sooner in the interim prn  ---------------------------------------------------------------------------------------------------------------  Patient is being evaluated by a video visit encounter for concerns as above. A caregiver was present when appropriate. Due to this being a TeleHealth encounter (During EMEZF-25 public health emergency), evaluation of the following organ systems was limited: Vitals/Constitutional/EENT/Resp/CV/GI//MS/Neuro/Skin/Heme-Lymph-Imm. Pursuant to the emergency declaration under the [facility] waiver authority and the [employer] and [employer] Act, this Virtual  Visit was conducted, with patient's (and/or legal guardian's) consent, to reduce the patient's risk of exposure to COVID-19 and provide necessary medical care. Services were provided through a video synchronous discussion virtually to substitute for in-person clinic visit. Patient was located at their own home. I was at home while conducting this encounter.   Consent:  She and/or her healthcare decision maker is aware that this patient-initiated Telehealth encounter is a billable service, with coverage as determined by her insurance carrier. She is aware that she may receive a bill and has provided verbal consent to proceed: Yes  This virtual visit was conducted via [employer]. Pursuant to the emergency declaration under the [facility], [facility] waiver authority and the [employer] and [employer] Act, this Virtual  Visit was conducted to reduce the patient's risk of exposure to COVID-19 and provide continuity of care for an established patient. Services were provided through a video synchronous discussion virtually to substitute for in-person clinic visit. Due to this being a TeleHealth evaluation, many elements of the physical examination are unable to be assessed. Total Time: minutes: 11-20 minutes.

## 2020-08-06 ENCOUNTER — PATIENT MESSAGE (OUTPATIENT)
Dept: FAMILY MEDICINE CLINIC | Age: 45
End: 2020-08-06

## 2020-09-21 DIAGNOSIS — F41.8 DEPRESSION WITH ANXIETY: ICD-10-CM

## 2020-09-21 RX ORDER — DESVENLAFAXINE 100 MG/1
100 TABLET, EXTENDED RELEASE ORAL DAILY
Qty: 90 TAB | Refills: 1 | Status: SHIPPED | OUTPATIENT
Start: 2020-09-21 | End: 2020-09-22 | Stop reason: SDUPTHER

## 2020-09-21 NOTE — TELEPHONE ENCOUNTER
MD Suarez,    Request for 90 d/s. Thanks, Tran    Last Visit: VV 7/22/20  Erick  Next Appointment: Not scheduled  Previous Refill Encounter(s): 7/22/20  30 + 1    Requested Prescriptions     Pending Prescriptions Disp Refills    Desvenlafaxine 100 mg Tb24 90 Tab 1     Sig: Take 1 Tab by mouth daily.

## 2020-09-22 ENCOUNTER — PATIENT MESSAGE (OUTPATIENT)
Dept: FAMILY MEDICINE CLINIC | Age: 45
End: 2020-09-22

## 2020-09-22 DIAGNOSIS — F41.8 DEPRESSION WITH ANXIETY: ICD-10-CM

## 2020-09-22 RX ORDER — DESVENLAFAXINE 100 MG/1
100 TABLET, EXTENDED RELEASE ORAL DAILY
Qty: 90 TAB | Refills: 1 | Status: SHIPPED | OUTPATIENT
Start: 2020-09-22 | End: 2021-05-25

## 2020-09-22 NOTE — TELEPHONE ENCOUNTER
From: Chan Roth  To: Zara Arizmendi MD  Sent: 9/22/2020 8:01 AM EDT  Subject: Prescription Question    Hi Dr. Elian Norwood,    Thank you for the refill for my medicine. I did receive a letter in the mail yesterday from [address] [place] [address] saying I must use a 90 day prescription for the Pristiq 100mg. Is this possible? I am sorry for the confusion. Thanks for your help.     Batsheva Molina

## 2020-10-28 ENCOUNTER — TRANSCRIBE ORDER (OUTPATIENT)
Dept: SCHEDULING | Age: 45
End: 2020-10-28

## 2020-10-28 DIAGNOSIS — Z12.31 VISIT FOR SCREENING MAMMOGRAM: Primary | ICD-10-CM

## 2020-11-25 ENCOUNTER — HOSPITAL ENCOUNTER (OUTPATIENT)
Dept: MAMMOGRAPHY | Age: 45
Discharge: HOME | End: 2020-11-25
Attending: SPECIALIST
Payer: COMMERCIAL

## 2020-11-25 DIAGNOSIS — Z12.31 VISIT FOR SCREENING MAMMOGRAM: ICD-10-CM

## 2020-11-25 PROCEDURE — 77067 SCR MAMMO BI INCL CAD: CPT

## 2021-05-25 DIAGNOSIS — F41.8 DEPRESSION WITH ANXIETY: ICD-10-CM

## 2021-05-25 RX ORDER — DESVENLAFAXINE 100 MG/1
TABLET, EXTENDED RELEASE ORAL
Qty: 90 TABLET | Refills: 0 | Status: SHIPPED | OUTPATIENT
Start: 2021-05-25 | End: 2022-01-02

## 2021-07-26 DIAGNOSIS — Z00.00 ROUTINE GENERAL MEDICAL EXAMINATION AT A HEALTH CARE FACILITY: Primary | ICD-10-CM

## 2021-08-03 ENCOUNTER — OFFICE VISIT (OUTPATIENT)
Dept: URGENT CARE | Age: 46
End: 2021-08-03
Payer: COMMERCIAL

## 2021-08-03 VITALS — RESPIRATION RATE: 16 BRPM | HEART RATE: 83 BPM | TEMPERATURE: 98.3 F | OXYGEN SATURATION: 98 %

## 2021-08-03 DIAGNOSIS — J02.9 SORE THROAT: ICD-10-CM

## 2021-08-03 DIAGNOSIS — Z11.59 SCREENING FOR VIRAL DISEASE: ICD-10-CM

## 2021-08-03 DIAGNOSIS — R05.9 COUGH: Primary | ICD-10-CM

## 2021-08-03 DIAGNOSIS — R09.81 NASAL CONGESTION: ICD-10-CM

## 2021-08-03 LAB
S PYO AG THROAT QL: NEGATIVE
SARS-COV-2 POC: NEGATIVE
VALID INTERNAL CONTROL?: YES

## 2021-08-03 PROCEDURE — 87426 SARSCOV CORONAVIRUS AG IA: CPT | Performed by: FAMILY MEDICINE

## 2021-08-03 PROCEDURE — 99203 OFFICE O/P NEW LOW 30 MIN: CPT | Performed by: FAMILY MEDICINE

## 2021-08-03 PROCEDURE — 87880 STREP A ASSAY W/OPTIC: CPT | Performed by: FAMILY MEDICINE

## 2021-08-03 NOTE — PROGRESS NOTES
This patient was seen at [address] [place] [address] Urgent Care while in their vehicle due to COVID-19 pandemic with PPE and focused examination in order to decrease community viral transmission. The patient/guardian gave verbal consent to treat. Bernardino Grant is a 39 y.o. female who presents cough, nasal congestion, itchy throat x 5 days. Recently travelled to [facility]. No known COVID contacts. Denies fever, SOB, N/V/D. The history is provided by the patient. Past Medical History:   Diagnosis Date    Breast cyst     Excised R Breast : Dr Anitha Chun section 3/18/09    GYN Dr. Leda Duarte;  Fetal distress    Degenerative arthritis of great toe w/ bone spurs 2014    S/p CSI of toe per foot doctor; surgery is recommended     Depression with anxiety since teens    Impaired fasting glucose 2018    Migraine with aura and without status migrainosus, not intractable 1/10/2018    Childhood, 20's, 42; visual aura; no neuro sx    Mild Thyromegaly w/o cysts or nodules and without obstructive symptoms 2016    [place]     Mildly Elevated Triglycerides 2014    Postpartum depression 2009    Seasonal allergic rhinitis     Spring/    Sebaceous cyst     Excised  Derm Dr. Pasquale Rico Vitamin D deficiency 2014        Past Surgical History:   Procedure Laterality Date    HX BREAST BIOPSY Right     neg    HX  SECTION      HX  SECTION  3/17/2013    HX LAP CHOLECYSTECTOMY  13    Dr Briana Gutiérrez for gallstones    HX ORTHOPAEDIC Right 2014    cheilectomy; right foot, spur, arthritis    AL BREAST SURGERY PROCEDURE UNLISTED       breast right cyst benign         Family History   Problem Relation Age of Onset    High Cholesterol Mother     Hypertension Mother     Elevated Lipids Mother     Depression Mother         Zoloft    Thyroid Disease Mother     Liver Disease Mother 79        autoimmune hepatitis    Asthma Father     Heart Disease Father         cardiomegaly    Other Brother         good health    Other Sister         good health    Stroke Maternal Grandmother          [de-identified]    Heart Disease Maternal Grandmother     Depression Maternal Grandmother     Heart Disease Maternal Grandfather          80    Dementia Maternal Grandfather     Heart Disease Paternal Grandmother          [de-identified]    Cancer Paternal Grandfather         oral CA pipe smoker,  80's    Depression Maternal Aunt         2 aunts    Thyroid Disease Maternal Aunt         2 aunts    Depression Maternal Uncle     Attention Deficit Hyperactivity Disorder Son 8        and Dyslexia    No Known Problems Son         Social History     Socioeconomic History    Marital status:      Spouse name: Not on file    Number of children: 2    Years of education: Not on file    Highest education level: Not on file   Occupational History    Occupation: Teacher at [employer], full time     Employer: [employer]   Tobacco Use    Smoking status: Never Smoker    Smokeless tobacco: Never Used   Substance and Sexual Activity    Alcohol use: Yes     Comment: socially a few x a month, occ    Drug use: No    Sexual activity: Yes     Partners: Male     Birth control/protection: Condom     Comment: Stopped her BCP 2013 d/t fluid retention   Other Topics Concern     Service Not Asked    Blood Transfusions Not Asked    Caffeine Concern No     Comment: 1 cup of coffee a day    Occupational Exposure Not Asked    Hobby Hazards Not Asked    Sleep Concern Not Asked    Stress Concern Not Asked    Weight Concern No    Special Diet Yes     Comment: since 6-10-19: [employer] Me[employer] Pan: 3 meals a day, no snacks, no sugars, no flour    Back Care Not Asked    Exercise Yes     Comment: 3 x a week: elliptical x 40 minutes    Bike Helmet Not Asked    Seat Belt Not Asked    Self-Exams Not Asked   Social History Narrative    Not on file     Social Determinants of Health     Financial Resource Strain:     Difficulty of Paying Living Expenses:    Food Insecurity:     Worried About Running Out of Food in the Last Year:     [address] in the Last Year:    Transportation Needs:     Lack of Transportation (Medical):  Lack of Transportation (Non-Medical):    Physical Activity:     Days of Exercise per Week:     Minutes of Exercise per Session:    Stress:     Feeling of Stress :    Social Connections:     Frequency of Communication with Friends and Family:     Frequency of Social Gatherings with Friends and Family:     Attends Christianity Services:     Active Member of Clubs or Organizations:     Attends Club or Organization Meetings:     Marital Status:    Intimate Partner Violence:     Fear of Current or Ex-Partner:     Emotionally Abused:     Physically Abused:     Sexually Abused: ALLERGIES: Adhesive, Adhesive tape-silicones, Sulfa (sulfonamide antibiotics), and Wellbutrin [bupropion hcl]    Review of Systems   Constitutional: Negative for activity change, appetite change and fever. HENT: Positive for congestion and sore throat. Respiratory: Positive for cough. Negative for shortness of breath. Gastrointestinal: Negative for diarrhea, nausea and vomiting. Vitals:    08/03/21 1229   Pulse: 83   Resp: 16   Temp: 98.3 °F (36.8 °C)   SpO2: 98%       Physical Exam  Vitals and nursing note reviewed. Constitutional:       General: She is not in acute distress. Appearance: She is well-developed. She is not diaphoretic. HENT:      Nose: Congestion present. Mouth/Throat:      Mouth: Mucous membranes are moist.      Pharynx: Oropharynx is clear. Posterior oropharyngeal erythema present. Pulmonary:      Effort: Pulmonary effort is normal. No respiratory distress. Breath sounds: Normal breath sounds. No stridor. No wheezing, rhonchi or rales.    Neurological: Mental Status: She is alert. Psychiatric:         Behavior: Behavior normal.         Thought Content: Thought content normal.         Judgment: Judgment normal.         MDM    ICD-10-CM ICD-9-CM   1. Cough  R05 786.2   2. Nasal congestion  R09.81 478.19   3. Screening for viral disease  Z11.59 V73.99   4. Sore throat  J02.9 462       Orders Placed This Encounter    UPPER RESPIRATORY CULTURE    NOVEL CORONAVIRUS (COVID-19)     Scheduling Instructions:      1) Due to current limited availability of the COVID-19 PCR test, tests will be prioritized and may not be completed.              2) Order only if the test result will change clinical management or necessary for a return to mission-critical employment decision.              3) Print and instruct patient to adhere to CDC home isolation program. (Link Above)              4) Set up or refer patient for a monitoring program.              5) Have patient sign up for and leverage MyChart (if not previously done). Order Specific Question:   Is this test for diagnosis or screening? Answer:   Diagnosis of ill patient     Order Specific Question:   Symptomatic for COVID-19 as defined by CDC? Answer:   Yes     Order Specific Question:   Date of Symptom Onset     Answer:   7/29/2021     Order Specific Question:   Hospitalized for COVID-19? Answer:   No     Order Specific Question:   Admitted to ICU for COVID-19? Answer:   No     Order Specific Question:   Employed in healthcare setting? Answer:   Unknown     Order Specific Question:   Resident in a congregate (group) care setting? Answer:   No     Order Specific Question:   Pregnant? Answer:   No     Order Specific Question:   Previously tested for COVID-19? Answer:   Unknown    AMB POC RAPID STREP A    AMB POC SARS-COV-2     Order Specific Question:   Is this test for diagnosis or screening?      Answer:   Diagnosis of ill patient     Order Specific Question:   Symptomatic for COVID-19 as defined by CDC? Answer:   Yes     Order Specific Question:   Date of Symptom Onset     Answer:   7/29/2021     Order Specific Question:   Hospitalized for COVID-19? Answer:   No     Order Specific Question:   Admitted to ICU for COVID-19? Answer:   No     Order Specific Question:   Employed in healthcare setting? Answer:   Unknown     Order Specific Question:   Resident in a congregate (group) care setting? Answer:   No     Order Specific Question:   Pregnant? Answer:   No     Order Specific Question:   Previously tested for COVID-19? Answer:   Unknown      Quarantine, await PCR, throat cx results  Deep breathing exercises, ambulation  Tylenol prn    MyChart: active  Provider will call if PCR COVID-19 test is positive       If signs and symptoms become worse the pt is to go to the ER.        Results for orders placed or performed in visit on 08/03/21   AMB POC RAPID STREP A   Result Value Ref Range    VALID INTERNAL CONTROL POC Yes     Group A Strep Ag Negative Negative   AMB POC SARS-COV-2   Result Value Ref Range    SARS-COV-2 POC Negative Negative     Procedures

## 2021-08-05 LAB
SARS-COV-2, NAA 2 DAY TAT: NORMAL
SARS-COV-2, NAA: NOT DETECTED

## 2021-08-06 LAB — BACTERIA SPEC RESP CULT: NORMAL

## 2021-08-26 ENCOUNTER — LAB ONLY (OUTPATIENT)
Dept: FAMILY MEDICINE CLINIC | Age: 46
End: 2021-08-26

## 2021-08-26 DIAGNOSIS — Z00.00 ROUTINE GENERAL MEDICAL EXAMINATION AT A HEALTH CARE FACILITY: ICD-10-CM

## 2021-08-26 LAB
25(OH)D3 SERPL-MCNC: 32.4 NG/ML (ref 30–100)
ALBUMIN SERPL-MCNC: 3.8 G/DL (ref 3.5–5)
ALBUMIN/GLOB SERPL: 1.2 {RATIO} (ref 1.1–2.2)
ALP SERPL-CCNC: 88 U/L (ref 45–117)
ALT SERPL-CCNC: 57 U/L (ref 12–78)
ANION GAP SERPL CALC-SCNC: 6 MMOL/L (ref 5–15)
AST SERPL-CCNC: 28 U/L (ref 15–37)
BILIRUB SERPL-MCNC: 0.6 MG/DL (ref 0.2–1)
BUN SERPL-MCNC: 15 MG/DL (ref 6–20)
BUN/CREAT SERPL: 30 (ref 12–20)
CALCIUM SERPL-MCNC: 8.9 MG/DL (ref 8.5–10.1)
CHLORIDE SERPL-SCNC: 106 MMOL/L (ref 97–108)
CHOLEST SERPL-MCNC: 200 MG/DL
CO2 SERPL-SCNC: 26 MMOL/L (ref 21–32)
CREAT SERPL-MCNC: 0.5 MG/DL (ref 0.55–1.02)
ERYTHROCYTE [DISTWIDTH] IN BLOOD BY AUTOMATED COUNT: 12.9 % (ref 11.5–14.5)
GLOBULIN SER CALC-MCNC: 3.3 G/DL (ref 2–4)
GLUCOSE SERPL-MCNC: 94 MG/DL (ref 65–100)
HCT VFR BLD AUTO: 40.6 % (ref 35–47)
HDLC SERPL-MCNC: 53 MG/DL
HDLC SERPL: 3.8 {RATIO} (ref 0–5)
HGB BLD-MCNC: 13.3 G/DL (ref 11.5–16)
LDLC SERPL CALC-MCNC: 113.4 MG/DL (ref 0–100)
MCH RBC QN AUTO: 31.1 PG (ref 26–34)
MCHC RBC AUTO-ENTMCNC: 32.8 G/DL (ref 30–36.5)
MCV RBC AUTO: 94.9 FL (ref 80–99)
NRBC # BLD: 0 K/UL (ref 0–0.01)
NRBC BLD-RTO: 0 PER 100 WBC
PLATELET # BLD AUTO: 289 K/UL (ref 150–400)
PMV BLD AUTO: 9.6 FL (ref 8.9–12.9)
POTASSIUM SERPL-SCNC: 4.2 MMOL/L (ref 3.5–5.1)
PROT SERPL-MCNC: 7.1 G/DL (ref 6.4–8.2)
RBC # BLD AUTO: 4.28 M/UL (ref 3.8–5.2)
SODIUM SERPL-SCNC: 138 MMOL/L (ref 136–145)
TRIGL SERPL-MCNC: 168 MG/DL (ref ?–150)
TSH SERPL DL<=0.05 MIU/L-ACNC: 1.67 UIU/ML (ref 0.36–3.74)
VLDLC SERPL CALC-MCNC: 33.6 MG/DL
WBC # BLD AUTO: 4.7 K/UL (ref 3.6–11)

## 2021-08-30 ENCOUNTER — OFFICE VISIT (OUTPATIENT)
Dept: FAMILY MEDICINE CLINIC | Age: 46
End: 2021-08-30
Payer: COMMERCIAL

## 2021-08-30 VITALS
SYSTOLIC BLOOD PRESSURE: 132 MMHG | TEMPERATURE: 98.4 F | BODY MASS INDEX: 30.19 KG/M2 | DIASTOLIC BLOOD PRESSURE: 80 MMHG | WEIGHT: 199.2 LBS | HEIGHT: 68 IN | RESPIRATION RATE: 18 BRPM | HEART RATE: 84 BPM | OXYGEN SATURATION: 98 %

## 2021-08-30 DIAGNOSIS — S22.41XA CLOSED FRACTURE OF MULTIPLE RIBS OF RIGHT SIDE, INITIAL ENCOUNTER: Primary | ICD-10-CM

## 2021-08-30 PROCEDURE — 99214 OFFICE O/P EST MOD 30 MIN: CPT | Performed by: FAMILY MEDICINE

## 2021-08-30 RX ORDER — IBUPROFEN 200 MG
TABLET ORAL
COMMUNITY
End: 2021-08-30

## 2021-08-30 RX ORDER — IBUPROFEN 200 MG
600 TABLET ORAL
COMMUNITY
End: 2021-11-03 | Stop reason: ALTCHOICE

## 2021-08-30 RX ORDER — ACETAMINOPHEN 500 MG
1000 TABLET ORAL
COMMUNITY
End: 2021-08-30 | Stop reason: ALTCHOICE

## 2021-08-30 RX ORDER — HYDROCODONE BITARTRATE AND ACETAMINOPHEN 5; 325 MG/1; MG/1
1 TABLET ORAL
Qty: 10 TABLET | Refills: 0 | Status: SHIPPED | OUTPATIENT
Start: 2021-08-30 | End: 2021-09-09

## 2021-08-30 RX ORDER — CHOLECALCIFEROL TAB 125 MCG (5000 UNIT) 125 MCG
5000 TAB ORAL DAILY
COMMUNITY
End: 2022-09-21 | Stop reason: ALTCHOICE

## 2021-08-30 NOTE — LETTER
NOTIFICATION FOR WORK / SCHOOL    2021     RE: Luis Alberto Cochran  : [phone]      To Whom It May Concern:    Luis Alberto Cochran is currently under the care of MEGHAN Morales. Please excuse from work -9/3 due to current medical condition. Please allow light duty -9/10 if needed. Thank you for your assistance. If there are questions or concerns please have the patient contact our office.         Sincerely,      Joyce Mathis MD

## 2021-08-30 NOTE — PATIENT INSTRUCTIONS
Broken Rib: Care Instructions  Your Care Instructions     A broken rib is a crack or break in one of the bones of the rib cage. Breathing can be very painful because the muscles used for breathing pull on the rib. In most cases, a broken rib will heal on its own. You can take pain medicine while the rib mends. Pain relief allows you to take deep breaths. In the past, doctors recommended taping or wrapping broken ribs. This is no longer done because taping makes it hard for you to take deep breaths. Taking deep breaths may help prevent pneumonia or a partial collapse of a lung. Your rib will heal in about 6 weeks. You heal best when you take good care of yourself. Eat a variety of healthy foods, and don't smoke. Follow-up care is a key part of your treatment and safety. Be sure to make and go to all appointments, and call your doctor if you are having problems. It's also a good idea to know your test results and keep a list of the medicines you take. How can you care for yourself at home? · Be safe with medicines. Read and follow all instructions on the label. ? If the doctor gave you a prescription medicine for pain, take it as prescribed. ? If you are not taking a prescription pain medicine, ask your doctor if you can take an over-the-counter medicine. · Even if it hurts, try to cough or take the deepest breath you can at least once every hour. This will get air deeply into your lungs. This may reduce your chance of getting pneumonia or a partial collapse of a lung. Hold a pillow against your chest to make this less painful. · Put ice or a cold pack on the area for 10 to 20 minutes at a time. Put a thin cloth between the ice and your skin. When should you call for help? Call 911 anytime you think you may need emergency care. For example, call if:    · You have severe trouble breathing.    Call your doctor now or seek immediate medical care if:    · You have some trouble breathing.     · You have a fever.     · You have a new or worse cough. Watch closely for changes in your health, and be sure to contact your doctor if:    · You have pain even after taking your medicine.     · You do not get better as expected. Where can you learn more? Go to http://www.gray.com/  Enter M135 in the search box to learn more about \"Broken Rib: Care Instructions. \"  Current as of: November 16, 2020               Content Version: 12.8  © 2006-2021 [employer]. Care instructions adapted under license by [employer] (which disclaims liability or warranty for this information). If you have questions about a medical condition or this instruction, always ask your healthcare professional. [address] any warranty or liability for your use of this information.

## 2021-08-30 NOTE — PROGRESS NOTES
Chief Complaint   Patient presents with   [address] when traveling to [place]. Patient states she was cleaning and fell and fractured 3 ribs. 3 most recent PHQ Screens 8/30/2021   PHQ Not Done -   Little interest or pleasure in doing things Not at all   Feeling down, depressed, irritable, or hopeless Not at all   Total Score PHQ 2 0     Abuse Screening Questionnaire 8/30/2021   Do you ever feel afraid of your partner? N   Are you in a relationship with someone who physically or mentally threatens you? N   Is it safe for you to go home? Y     Visit Vitals  /80 (BP 1 Location: Left arm, BP Patient Position: Sitting, BP Cuff Size: Large adult)   Pulse 84   Temp 98.4 °F (36.9 °C) (Oral)   Resp 18   Ht 5' 8\" (1.727 m)   Wt 199 lb 3.2 oz (90.4 kg)   SpO2 98%   BMI 30.29 kg/m²     1. Have you been to the ER, urgent care clinic since your last visit? Hospitalized since your last visit? Urgent Care fractured ribs    2. Have you seen or consulted any other health care providers outside of the [address] [place] [address] since your last visit? Include any pap smears or colon screening.  [facility]

## 2021-08-30 NOTE — PROGRESS NOTES
Chief Complaint   Patient presents with   [facility] Follow-up     Right Rib Fractures s/p Fall 8/22/21       HISTORY OF PRESENT ILLNESS   HPI   Patient presents for follow up from an urgent care in [place] after falling in her mother's kitchen on 8/22 (outside reports reviewed). She was bent over sweeping the floor, lost her balance, fell sideways and hit her right ribs against the corner of the wall. The pain was excruciating so she ended up going to the urgent care about an hour later. Xrays revealed 3 fractured ribs on the right posterior lateral side. There was no pneumothorax. She was given an incentive spirometer and Hydrocodone 5/325 po x 1 and dc'd w/ rx for #12. Since completing those she has been alternating between Tylenol XS 2 tabs and Motrin 3-4 tabs q6hr prn. She is gradually getting better from a pain standpoint. Initially any movements hurt/bothered her. She couldn't bend over, put on clothes, or do simple self care tasks. That is getting gradually better. She is still guarded but better able to function. Her pain is rated 3-4/10 during the day w/ the Tylenol/Motrin but she is still really uncomfortable sleeping at night due to pain and not being able to situate herself in bed comfortably. Her sleep is disrupted. She is using the spirometer and her breathing is good. No cough, chest pain, sob or fevers. She also wanted to be sure there was nothing neurologically wrong because 5 days before this fall, she slipped on her steps at home. She tweaked her left knee and pulled her left thigh muscle but that is much better and not causing her issues. She wanted to be sure she wasn't having a neurologic issue because of these 2 instances because a friend started off w/ frequent falls the same way and ended up being diagnosed w/ a brain tumor. She denies headaches, vision changes, change in speech/coordination, increased clumsiness, dizziness, light headedness or other symptoms/complaints.  She denies frequent fracture hx. She denies tobacco use, chronic prednisone use, excess caffeine intake, significant weight loss, change in menstrual patterns or family h/o early osteoporosis. Her recent labs done showed normal calcium, thyroid and vitamin d. REVIEW OF SYMPTOMS   Review of Systems   Constitutional: Negative. Eyes: Negative. Respiratory: Negative for cough, shortness of breath and wheezing. Cardiovascular: Negative for chest pain. Gastrointestinal: Negative. Neurological: Negative for dizziness, sensory change, speech change and loss of consciousness. Endo/Heme/Allergies: Negative.             PROBLEM LIST/MEDICAL HISTORY     Problem List  Date Reviewed: 8/30/2021        Codes Class Noted    Mild hypercholesterolemia ICD-10-CM: E78.00  ICD-9-CM: 272.0  8/20/2018        Impaired fasting glucose ICD-10-CM: R73.01  ICD-9-CM: 790.21  8/8/2018    Overview Signed 8/8/2018 10:06 PM by Ed Bustillos MD     [id number]             Migraine with aura and without status migrainosus, not intractable ICD-10-CM: G43.109  ICD-9-CM: 346.00  1/10/2018    Overview Signed 1/10/2018 11:43 AM by Ed Bustillos MD     Childhood, 20's, 42; visual aura; no neuro sx             Patellofemoral stress syndrome of right knee ICD-10-CM: M22.2X1  ICD-9-CM: 719.46  6/7/2017        Tear of medial meniscus of right knee, current ICD-10-CM: V84.155R  ICD-9-CM: 836.0  6/7/2017        Mild Thyromegaly w/o cysts or nodules and without obstructive symptoms ICD-10-CM: E01.0  ICD-9-CM: 240.9  12/12/2016    Overview Addendum 1/10/2018 11:36 AM by Ed Bustillos MD      : Mildly enlarged left thyroid lobe             Vitamin D deficiency ICD-10-CM: E55.9  ICD-9-CM: 268.9  1/29/2014    Overview Signed 1/29/2014  1:33 PM by Ed Bustillos MD     2011             Mildly Elevated Triglycerides ICD-10-CM: E78.1  ICD-9-CM: 272.1  1/29/2014        Degenerative arthritis of great toe w/ bone spurs ICD-10-CM: M19.079  ICD-9-CM: 715.97  2014    Overview Signed 2014  2:06 PM by Edgar Oconnell MD     S/p CSI of toe per foot doctor; surgery is recommended              Breast cyst ICD-10-CM: N60.09  ICD-9-CM: 610.0  Unknown    Overview Signed 3/31/2010  3:43 PM by Edgar Oconnell MD     Excised R Breast : Dr Skyler Correa             Seasonal allergic rhinitis ICD-10-CM: J30.2  ICD-9-CM: 477.9  Unknown    Overview Signed 3/31/2010  3:44 PM by Edgar Oconnell MD     Spring/             Depression with anxiety ICD-10-CM: F41.8  ICD-9-CM: 300.4  Unknown        Postpartum depression ICD-10-CM: O99.345, F53.0  ICD-9-CM: 648.44, 311  2009    Overview Addendum 2014  1:34 PM by Edgar Oconnell MD     3/18/09  @ 38 wks fetal distress  3/2013 treated again for post partum             C section ICD-9-CM: V45.89  3/18/2009    Overview Signed 3/31/2010  3:41 PM by Edgar Oconnell MD     GYN Dr. Bee Yuen; Fetal distress                       PAST SURGICAL HISTORY     Past Surgical History:   Procedure Laterality Date    HX BREAST BIOPSY Right     neg    HX  SECTION  2009    HX  SECTION  3/17/2013    HX LAP CHOLECYSTECTOMY  13    Dr Stapleton Saliva for gallstones    HX ORTHOPAEDIC Right 2014    cheilectomy; right foot, spur, arthritis    VT BREAST SURGERY PROCEDURE UNLISTED       breast right cyst benign         MEDICATIONS     Current Outpatient Medications   Medication Sig    acetaminophen (Tylenol Extra Strength) 500 mg tablet Take 1,000 mg by mouth every six (6) hours as needed for Pain.  cholecalciferol (VITAMIN D3) (5000 Units/125 mcg) tab tablet Take 5,000 Units by mouth daily. Started 21    ibuprofen (Motrin IB) 200 mg tablet Take 600 mg by mouth every six (6) hours as needed for Pain.     Desvenlafaxine 100 mg Tb24 TAKE 1 TABLET BY MOUTH EVERY DAY    fluticasone propionate (FLONASE) 50 mcg/actuation nasal spray 2 Sprays by Nasal route.  multivitamin (ONE A DAY) tablet Take 1 Tab by mouth daily. No current facility-administered medications for this visit.           ALLERGIES     Allergies   Allergen Reactions    Adhesive Rash and Itching    Adhesive Tape-Silicones Itching and Rash    Sulfa (Sulfonamide Antibiotics) Nausea Only and Other (comments)     dizziness    Wellbutrin [Bupropion Hcl] Other (comments)     Increased agitation, not effective for mood          SOCIAL HISTORY     Social History     Tobacco Use    Smoking status: Never Smoker    Smokeless tobacco: Never Used   Substance Use Topics    Alcohol use: Yes     Comment: socially a few x a month, occ     Social History     Social History Narrative    Diet:since 6-9-21: [employer] Meal Plan (low calorie, more green veggies, lean protein)    Caffeine: 1-2 servings of coffee or Diet Coke a day    Exercise: nothing regular right now    Weight: working on losing weight         Social History     Substance and Sexual Activity   Sexual Activity Yes    Partners: Male    Birth control/protection: Condom    Comment: Stopped her BCP 6/2013 d/t fluid retention       IMMUNIZATIONS     Immunization History   Administered Date(s) Administered    Covid-19, MODERNA, Mrna, Lnp-s, Pf, 100mcg/0.5mL 01/26/2021, 02/24/2021    Influenza Vaccine 10/15/2015, 10/20/2016, 12/30/2017, 09/29/2019, 10/09/2020    Influenza Vaccine (Quad) Mdck Pf (>4 Yrs Flucelvax QUAD [id number]) 12/30/2017    Influenza Vaccine Vouch) PF (>6 Mo Flulaval, Fluarix, and >3 Yrs Afluria, Fluzone [id number]) 02/05/2015, 10/28/2018, 09/28/2019    TD Vaccine 04/04/2011    Tdap 07/31/2020         FAMILY HISTORY     Family History   Problem Relation Age of Onset    High Cholesterol Mother     Hypertension Mother     Elevated Lipids Mother     Depression Mother         Zoloft    Thyroid Disease Mother     Liver Disease Mother 79        autoimmune hepatitis    Asthma Father     Heart Disease Father cardiomegaly    Other Brother         good health    Other Sister         good health    Stroke Maternal Grandmother          [de-identified]    Heart Disease Maternal Grandmother     Depression Maternal Grandmother     Heart Disease Maternal Grandfather          80    Dementia Maternal Grandfather     Heart Disease Paternal Grandmother          [de-identified]    Cancer Paternal Grandfather         oral CA pipe smoker,  80's    Depression Maternal Aunt         2 aunts    Thyroid Disease Maternal Aunt         2 aunts    Depression Maternal Uncle     Attention Deficit Hyperactivity Disorder Son 8        and Dyslexia    No Known Problems Son          VITALS     Visit Vitals  /80 (BP 1 Location: Left arm, BP Patient Position: Sitting, BP Cuff Size: Large adult)   Pulse 84   Temp 98.4 °F (36.9 °C) (Oral)   Resp 18   Ht 5' 8\" (1.727 m)   Wt 199 lb 3.2 oz (90.4 kg)   LMP 2021 (Exact Date)   SpO2 98%   BMI 30.29 kg/m²          PHYSICAL EXAMINATION   Physical Exam  Vitals reviewed. Constitutional:       General: She is not in acute distress. Appearance: Normal appearance. Eyes:      Extraocular Movements: Extraocular movements intact. Pupils: Pupils are equal, round, and reactive to light. Cardiovascular:      Rate and Rhythm: Normal rate and regular rhythm. Heart sounds: Normal heart sounds. Pulmonary:      Effort: Pulmonary effort is normal.      Breath sounds: Normal breath sounds. Skin:     General: Skin is warm and dry. Neurological:      General: No focal deficit present. Mental Status: She is alert and oriented to person, place, and time. Mental status is at baseline. Motor: Motor function is intact. Coordination: Coordination is intact. Gait: Gait is intact.    Psychiatric:         Mood and Affect: Mood and affect normal.         Speech: Speech normal.                LABORATORY DATA/ANCILLARY/IMAGING     Results for orders placed or performed in visit on 08/26/21   VITAMIN D, 25 HYDROXY   Result Value Ref Range    Vitamin D 25-Hydroxy 32.4 30 - 100 ng/mL   TSH 3RD GENERATION   Result Value Ref Range    TSH 1.67 0.36 - 3.74 uIU/mL   LIPID PANEL   Result Value Ref Range    Cholesterol, total 200 (H) <200 MG/DL    Triglyceride 168 (H) <150 MG/DL    HDL Cholesterol 53 MG/DL    LDL, calculated 113.4 (H) 0 - 100 MG/DL    VLDL, calculated 33.6 MG/DL    CHOL/HDL Ratio 3.8 0.0 - 5.0     METABOLIC PANEL, COMPREHENSIVE   Result Value Ref Range    Sodium 138 136 - 145 mmol/L    Potassium 4.2 3.5 - 5.1 mmol/L    Chloride 106 97 - 108 mmol/L    CO2 26 21 - 32 mmol/L    Anion gap 6 5 - 15 mmol/L    Glucose 94 65 - 100 mg/dL    BUN 15 6 - 20 MG/DL    Creatinine 0.50 (L) 0.55 - 1.02 MG/DL    BUN/Creatinine ratio 30 (H) 12 - 20      GFR est AA >60 >60 ml/min/1.73m2    GFR est non-AA >60 >60 ml/min/1.73m2    Calcium 8.9 8.5 - 10.1 MG/DL    Bilirubin, total 0.6 0.2 - 1.0 MG/DL    ALT (SGPT) 57 12 - 78 U/L    AST (SGOT) 28 15 - 37 U/L    Alk.  phosphatase 88 45 - 117 U/L    Protein, total 7.1 6.4 - 8.2 g/dL    Albumin 3.8 3.5 - 5.0 g/dL    Globulin 3.3 2.0 - 4.0 g/dL    A-G Ratio 1.2 1.1 - 2.2     CBC W/O DIFF   Result Value Ref Range    WBC 4.7 3.6 - 11.0 K/uL    RBC 4.28 3.80 - 5.20 M/uL    HGB 13.3 11.5 - 16.0 g/dL    HCT 40.6 35.0 - 47.0 %    MCV 94.9 80.0 - 99.0 FL    MCH 31.1 26.0 - 34.0 PG    MCHC 32.8 30.0 - 36.5 g/dL    RDW 12.9 11.5 - 14.5 %    PLATELET 666 170 - 698 K/uL    MPV 9.6 8.9 - 12.9 FL    NRBC 0.0 0  WBC    ABSOLUTE NRBC 0.00 0.00 - 0.01 K/uL       Lab Results   Component Value Date/Time    Hemoglobin A1c 5.2 07/26/2019 08:39 AM    Hemoglobin A1c 5.1 08/13/2018 09:00 AM    Glucose 94 08/26/2021 09:16 AM    LDL, calculated 113.4 (H) 08/26/2021 09:16 AM    Creatinine 0.50 (L) 08/26/2021 09:16 AM          ASSESSMENT & PLAN       ICD-10-CM ICD-9-CM    1. Closed fracture of multiple ribs of right side, initial encounter as follow up from Urgent Care on day of injury 8/22/21 S22.41XA 807.09 HYDROcodone-acetaminophen (NORCO) 5-325 mg per tablet     Outside reports reviewed. Xrays revealed 3 mildly displaced fractures of the right 7th, 8th, and 9th ribs on the posterior lateral side. There was no pneumothorax. She was given an incentive spirometer and Hydrocodone 5/325 po x 1 and dc'd w/ rx for #12. Since completing those she has been alternating between Tylenol XS 2 tabs and Motrin 3-4 tabs q6hr prn. She is gradually improving.  reviewed. No problems noted. Will extend Hydrocodone rx for night time usage 1 tab qhs prn over the next 7-10 days. Reviewed medications, effects, precautions and potential side effects in detail. She is not driving yet. 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 Work note given excusing her this week just in case and to return to light duty next week. Continue spirometry. Low level activity and as tolerated. Also reviewed the results of her recent labs done 8-26-21 along w/ recommendations. She is scheduled for CPE w/ me in November but will call back or return sooner in the interim for any problems, concerns or if she does not continue to improve along expectant course.  Total Time: 30 minutes

## 2021-08-30 NOTE — PROGRESS NOTES
Labs pre-ordered for review at upcoming appointment with PCP:    Future Appointments  Date Time Provider Jaqui Johanna  8/30/2021 11:30 AM Delilah Suarez MD [facility] AMB  11/3/2021 11:00 AM Delilah Suarez MD [facility] AMB

## 2021-10-22 ENCOUNTER — TRANSCRIBE ORDER (OUTPATIENT)
Dept: SCHEDULING | Age: 46
End: 2021-10-22

## 2021-10-22 DIAGNOSIS — Z12.31 SCREENING MAMMOGRAM FOR HIGH-RISK PATIENT: Primary | ICD-10-CM

## 2021-11-03 ENCOUNTER — OFFICE VISIT (OUTPATIENT)
Dept: FAMILY MEDICINE CLINIC | Age: 46
End: 2021-11-03
Payer: COMMERCIAL

## 2021-11-03 VITALS
RESPIRATION RATE: 16 BRPM | WEIGHT: 200.6 LBS | SYSTOLIC BLOOD PRESSURE: 122 MMHG | BODY MASS INDEX: 30.4 KG/M2 | OXYGEN SATURATION: 98 % | DIASTOLIC BLOOD PRESSURE: 74 MMHG | HEART RATE: 86 BPM | HEIGHT: 68 IN | TEMPERATURE: 98.2 F

## 2021-11-03 DIAGNOSIS — Z23 NEEDS FLU SHOT: ICD-10-CM

## 2021-11-03 DIAGNOSIS — Z00.00 ROUTINE GENERAL MEDICAL EXAMINATION AT A HEALTH CARE FACILITY: Primary | ICD-10-CM

## 2021-11-03 DIAGNOSIS — E78.2 MIXED HYPERLIPIDEMIA: ICD-10-CM

## 2021-11-03 DIAGNOSIS — Z23 ENCOUNTER FOR IMMUNIZATION: ICD-10-CM

## 2021-11-03 DIAGNOSIS — R73.03 PREDIABETES: ICD-10-CM

## 2021-11-03 LAB — HBA1C MFR BLD HPLC: 5.1 %

## 2021-11-03 PROCEDURE — 90686 IIV4 VACC NO PRSV 0.5 ML IM: CPT | Performed by: FAMILY MEDICINE

## 2021-11-03 PROCEDURE — 83036 HEMOGLOBIN GLYCOSYLATED A1C: CPT | Performed by: FAMILY MEDICINE

## 2021-11-03 PROCEDURE — 90471 IMMUNIZATION ADMIN: CPT | Performed by: FAMILY MEDICINE

## 2021-11-03 PROCEDURE — 99396 PREV VISIT EST AGE 40-64: CPT | Performed by: FAMILY MEDICINE

## 2021-11-03 RX ORDER — MELOXICAM 15 MG/1
15 TABLET ORAL DAILY
COMMUNITY

## 2021-11-03 NOTE — PATIENT INSTRUCTIONS
Learning About Low-Carbohydrate Foods What foods are low in carbohydrate? The foods you eat contain nutrients, such as vitamins and minerals. Carbohydrate is a nutrient. Your body needs the right amount to stay healthy and work as it should. You can use the list below to help you make choices about which foods to eat. Some foods that are lower in carbohydrate include: 
Dairy and dairy alternatives · Cheese · [address] · Cream cheese · Nut milk (unsweetened) · Soy milk (unsweetened) · Yogurt (Greek, plain) Fruits · Avocado · Ethyl Milder Meats and other protein foods · Almonds · Beef · Chicken · [address] · Eggs · Halibut · Peanut butter and other nut butters · Pistachios · Pork · Pumpkin seeds · Tofu · Trout · [place] · [place] · Walnuts Vegetables · Broccoli · Carrots · Cauliflower · Green beans · Mushrooms · Peppers · Salad greens · Spinach · Tomatoes Work with your doctor to find out how much of this nutrient you need. Depending on your health, you may need more or less of it in your diet. Where can you learn more? Go to http://www.gray.com/ Enter [phone] in the search box to learn more about \"Learning About Low-Carbohydrate Foods. \" Current as of: December 17, 2020               Content Version: 13.0 © 4199-6049 [employer]. Care instructions adapted under license by [employer] (which disclaims liability or warranty for this information). If you have questions about a medical condition or this instruction, always ask your healthcare professional. [place] [address] any warranty or liability for your use of this information.

## 2021-11-03 NOTE — PROGRESS NOTES
Chief Complaint   Patient presents with    Complete Physical     had labs done in August 1. \"Have you been to the ER, urgent care clinic since your last visit? Hospitalized since your last visit? \" No    2. \"Have you seen or consulted any other health care providers outside of the [address] [place] [address] since your last visit? \" No     3. For patients over 45: Has the patient had a colonoscopy? No     If the patient is female:    4. For patients over 40: Has the patient had a mammogram? Scheduled for Dec    5. For patients over 21: Has the patient had a pap smear?  Gyn Dr Manjit Gillis 10/21/21

## 2021-11-03 NOTE — PROGRESS NOTES
Chief Complaint   Patient presents with    Complete Physical     had fasting labs done in August except A1c       HISTORY OF PRESENT ILLNESS   HPI  Annual CPE  Had fasting labs done in August  History of mild mixed hyperlipidemia over the years, only managed periodically w/ diet and or exercise  Also back in 2016 had mild fasting glucose elevation at 101. Subsequent HgbA1c's have all been normal. Last checked about 2 yrs ago normal at 5.1. Would be interested in seeing that it is now so POC A1C checked in office today was also 5.1. Diet:since 6-9-21: [employer] Meal Plan (low calorie, more green veggies, lean protein), but hit or miss lately and not as strict as when she started  Caffeine: 1-2 servings of coffee or Diet Coke a day  Exercise: nothing regular right now  Weight: working on losing weight now  Sees gyn for well woman visits/gyn exams. Pap reportedly negative in 2020 so deferred at her visit 10-21-21 this year. She was told it felt like she had a fibroid during her pelvic exam this year. She is scheduled for a pelvic US in December. Periods are regular and not having any  related complaints. REVIEW OF SYMPTOMS   Review of Systems   Constitutional: Negative. Negative for chills, fever and weight loss. HENT: Negative. Eyes: Negative. Respiratory: Negative. Cardiovascular: Negative. Gastrointestinal: Negative. Genitourinary: Negative. Neurological: Negative. Endo/Heme/Allergies: Negative. Psychiatric/Behavioral: Negative.          Despite stressful year, doing ok on current regimen of Pristiq for now           PROBLEM LIST/MEDICAL HISTORY     Problem List  Date Reviewed: 11/3/2021          Codes Class Noted    Mixed hyperlipidemia ICD-10-CM: E78.2  ICD-9-CM: 272.2  11/3/2021        Multiple closed fractures of ribs of right side ICD-10-CM: S22.41XA  ICD-9-CM: 807.09  8/30/2021    Overview Addendum 8/30/2021  4:16 PM by Anastasia Greenberg MD     8-22-21, Fall in [facility], [place], [place]; Xrays: mildly displaced fractures of the posterior lateral aspects of right 7th, 8th, 9th ribs, no pneumothorax             Mild hypercholesterolemia ICD-10-CM: E78.00  ICD-9-CM: 272.0  8/20/2018        Impaired fasting glucose ICD-10-CM: R73.01  ICD-9-CM: 790.21  8/8/2018    Overview Signed 8/8/2018 10:06 PM by Nemo Ramirez MD     [id number]             Migraine with aura and without status migrainosus, not intractable ICD-10-CM: G43.109  ICD-9-CM: 346.00  1/10/2018    Overview Signed 1/10/2018 11:43 AM by Nemo Ramirez MD     Childhood, 20's, 42; visual aura; no neuro sx             Patellofemoral stress syndrome of right knee ICD-10-CM: M22.2X1  ICD-9-CM: 719.46  6/7/2017        Tear of medial meniscus of right knee, current ICD-10-CM: C61.777G  ICD-9-CM: 836.0  6/7/2017        Mild Thyromegaly w/o cysts or nodules and without obstructive symptoms ICD-10-CM: E01.0  ICD-9-CM: 240.9  12/12/2016    Overview Addendum 1/10/2018 11:36 AM by Nemo Ramirez MD      : Mildly enlarged left thyroid lobe             Vitamin D deficiency ICD-10-CM: E55.9  ICD-9-CM: 268.9  1/29/2014    Overview Signed 1/29/2014  1:33 PM by Nemo Ramirez MD     2011             Mildly Elevated Triglycerides ICD-10-CM: E78.1  ICD-9-CM: 272.1  1/29/2014        Degenerative arthritis of great toe w/ bone spurs ICD-10-CM: M19.079  ICD-9-CM: 715.97  1/29/2014    Overview Signed 1/29/2014  2:06 PM by Nemo Ramirez MD     S/p CSI of toe per foot doctor; surgery is recommended 2014             Breast cyst ICD-10-CM: N60.09  ICD-9-CM: 610.0  Unknown    Overview Signed 3/31/2010  3:43 PM by Nemo Ramirez MD     Excised R Breast : Dr Alina Sargent             Seasonal allergic rhinitis ICD-10-CM: J30.2  ICD-9-CM: 477.9  Unknown    Overview Signed 3/31/2010  3:44 PM by Nemo Ramirez MD     Spring/fall             Depression with anxiety ICD-10-CM: F41.8  ICD-9-CM: 300.4  Unknown        Postpartum depression ICD-10-CM: O99.345, F53.0  ICD-9-CM: 648.44, 311  2009    Overview Addendum 2014  1:34 PM by Nicolás Winchester MD     3/18/09  @ 38 wks fetal distress  3/2013 treated again for post partum             C section ICD-9-CM: V45.89  3/18/2009    Overview Signed 3/31/2010  3:41 PM by Nicolás Winchester MD     GYN Dr. Ryland Dozier; Fetal distress                       PAST SURGICAL HISTORY     Past Surgical History:   Procedure Laterality Date    HX BREAST BIOPSY Right     neg    HX  SECTION      HX  SECTION  3/17/2013    HX LAP CHOLECYSTECTOMY  13    Dr Cowan Bedias for gallstones    HX ORTHOPAEDIC Right 2014    cheilectomy; right foot, spur, arthritis    CO BREAST SURGERY PROCEDURE UNLISTED       breast right cyst benign         MEDICATIONS     Current Outpatient Medications   Medication Sig    meloxicam (Mobic) 15 mg tablet Take 15 mg by mouth daily as needed (foot pain).  docosahexaenoic acid/epa (FISH OIL PO) Take  by mouth daily.  cholecalciferol (VITAMIN D3) (5000 Units/125 mcg) tab tablet Take 5,000 Units by mouth daily. Started 23    Desvenlafaxine 100 mg Tb24 TAKE 1 TABLET BY MOUTH EVERY DAY    fluticasone propionate (FLONASE) 50 mcg/actuation nasal spray 2 Sprays by Nasal route.  multivitamin (ONE A DAY) tablet Take 1 Tab by mouth daily. No current facility-administered medications for this visit.           ALLERGIES     Allergies   Allergen Reactions    Adhesive Rash and Itching    Adhesive Tape-Silicones Itching and Rash    Sulfa (Sulfonamide Antibiotics) Nausea Only and Other (comments)     dizziness    Wellbutrin [Bupropion Hcl] Other (comments)     Increased agitation, not effective for mood          SOCIAL HISTORY     Social History     Tobacco Use    Smoking status: Never Smoker    Smokeless tobacco: Never Used   Substance Use Topics    Alcohol use: Yes     Comment: socially a few x a month, occ     Social History     Social History Narrative    Diet:since 23: MICHELLE Do 77 (low calorie, more green veggies, lean protein), hit or miss    Caffeine: 1-2 servings of coffee or Diet Coke a day    Exercise: nothing regular right now    Weight: working on losing weight now         Social History     Substance and Sexual Activity   Sexual Activity Yes    Partners: Male    Birth control/protection: Condom    Comment: Stopped her BCP 2013 d/t fluid retention       IMMUNIZATIONS     Immunization History   Administered Date(s) Administered    COVID-19, Kanu Macias, Primary or Immunocompromised Series, MRNA, PF, 100mcg/0.5mL 2021, 2021    Influenza Vaccine 10/15/2015, 10/20/2016, 2017, 2019, 10/09/2020    Influenza Vaccine (Quad) Mdck Pf (>2 Yrs Flucelvax QUAD [id number]) 2017    Influenza Vaccine (Quad) PF (>6 Mo Flulaval, Fluarix, and >3 Yrs Afluria, Fluzone [id number]) 2015, 10/28/2018, 2019, 2021    TD Vaccine 2011    Tdap 2020         FAMILY HISTORY     Family History   Problem Relation Age of Onset    High Cholesterol Mother     Hypertension Mother     Elevated Lipids Mother     Depression Mother         Zoloft    Thyroid Disease Mother     Liver Disease Mother 79        autoimmune hepatitis    Asthma Father     Heart Disease Father         cardiomegaly    Other Brother         good health    Other Sister         good health    Stroke Maternal Grandmother          [de-identified]    Heart Disease Maternal Grandmother     Depression Maternal Grandmother     Heart Disease Maternal Grandfather          80    Dementia Maternal Grandfather     Heart Disease Paternal Grandmother          [de-identified]    Cancer Paternal Grandfather         oral CA pipe smoker,  80's    Depression Maternal Aunt         2 aunts    Thyroid Disease Maternal Aunt         2 aunts    Depression Maternal Uncle     Attention Deficit Hyperactivity Disorder Son 8        and Dyslexia    No Known Problems Son          VITALS     Visit Vitals  /74 (BP 1 Location: Left upper arm, BP Patient Position: Sitting, BP Cuff Size: Large adult)   Pulse 86   Temp 98.2 °F (36.8 °C) (Oral)   Resp 16   Ht 5' 8\" (1.727 m)   Wt 200 lb 9.6 oz (91 kg)   LMP 10/21/2021   SpO2 98%   BMI 30.50 kg/m²          PHYSICAL EXAMINATION   Physical Exam  Vitals reviewed. Constitutional:       General: She is not in acute distress. Appearance: Normal appearance. HENT:      Right Ear: Tympanic membrane normal.      Left Ear: Tympanic membrane normal.   Eyes:      General: No scleral icterus. Extraocular Movements: Extraocular movements intact. Conjunctiva/sclera: Conjunctivae normal.      Pupils: Pupils are equal, round, and reactive to light. Neck:      Thyroid: No thyroid tenderness. Vascular: No carotid bruit. Cardiovascular:      Rate and Rhythm: Normal rate and regular rhythm. Heart sounds: Normal heart sounds. No murmur heard. Pulmonary:      Effort: Pulmonary effort is normal.      Breath sounds: Normal breath sounds. Abdominal:      General: There is no distension. Palpations: Abdomen is soft. There is no mass. Tenderness: There is no abdominal tenderness. Musculoskeletal:         General: No swelling or tenderness. Cervical back: Neck supple. Right lower leg: No edema. Left lower leg: No edema. Lymphadenopathy:      Cervical: No cervical adenopathy. Neurological:      General: No focal deficit present. Mental Status: She is alert and oriented to person, place, and time. Mental status is at baseline.    Psychiatric:         Mood and Affect: Mood and affect normal.                LABORATORY DATA/ANCILLARY/IMAGING     Results for orders placed or performed in visit on 11/03/21   AMB POC HEMOGLOBIN A1C   Result Value Ref Range    Hemoglobin A1c (POC) 5.1 % Lab Results   Component Value Date/Time    WBC 4.7 08/26/2021 09:16 AM    HGB 13.3 08/26/2021 09:16 AM    HCT 40.6 08/26/2021 09:16 AM    PLATELET 511 92/43/8238 09:16 AM    MCV 94.9 08/26/2021 09:16 AM     Lab Results   Component Value Date/Time    Hemoglobin A1c 5.2 07/26/2019 08:39 AM    Hemoglobin A1c 5.1 08/13/2018 09:00 AM    Glucose 94 08/26/2021 09:16 AM    LDL, calculated 113.4 (H) 08/26/2021 09:16 AM    Creatinine 0.50 (L) 08/26/2021 09:16 AM      Lab Results   Component Value Date/Time    Cholesterol, total 200 (H) 08/26/2021 09:16 AM    HDL Cholesterol 53 08/26/2021 09:16 AM    LDL, calculated 113.4 (H) 08/26/2021 09:16 AM    Triglyceride 168 (H) 08/26/2021 09:16 AM    CHOL/HDL Ratio 3.8 08/26/2021 09:16 AM     Lab Results   Component Value Date/Time    TSH 1.67 08/26/2021 09:16 AM    T4, Free 1.20 08/13/2018 09:00 AM      Lab Results   Component Value Date/Time    Sodium 138 08/26/2021 09:16 AM    Potassium 4.2 08/26/2021 09:16 AM    Chloride 106 08/26/2021 09:16 AM    CO2 26 08/26/2021 09:16 AM    Anion gap 6 08/26/2021 09:16 AM    Glucose 94 08/26/2021 09:16 AM    BUN 15 08/26/2021 09:16 AM    Creatinine 0.50 (L) 08/26/2021 09:16 AM    BUN/Creatinine ratio 30 (H) 08/26/2021 09:16 AM    GFR est AA >60 08/26/2021 09:16 AM    GFR est non-AA >60 08/26/2021 09:16 AM    Calcium 8.9 08/26/2021 09:16 AM    Bilirubin, total 0.6 08/26/2021 09:16 AM    ALT (SGPT) 57 08/26/2021 09:16 AM    Alk. phosphatase 88 08/26/2021 09:16 AM    Protein, total 7.1 08/26/2021 09:16 AM    Albumin 3.8 08/26/2021 09:16 AM    Globulin 3.3 08/26/2021 09:16 AM    A-G Ratio 1.2 08/26/2021 09:16 AM          ASSESSMENT & PLAN   Diagnoses and all orders for this visit:    1. Routine general medical examination at a health care facility    2.  Mixed hyperlipidemia, untreated, work on lifestyle modification/diet/exercise  The 10-year ASCVD risk score (Gloria Escalera, et al., 2013) is: 0.7%    Values used to calculate the score:      Age: 42 years      Sex: Female      Is Non- : No      Diabetic: No      Tobacco smoker: No      Systolic Blood Pressure: 251 mmHg      Is BP treated: No      HDL Cholesterol: 53 MG/DL      Total Cholesterol: 200 MG/DL      3. Prediabetes, recent HgbA1c's all normal  -     AMB POC HEMOGLOBIN A1C: 5.1    4. Encounter for immunization  -     IN IMMUNIZ ADMIN,1 SINGLE/COMB VAC/TOXOID  -     INFLUENZA VIRUS VAC QUAD,SPLIT,PRESV FREE SYRINGE IM    5. Needs flu shot  -     INFLUENZA VIRUS VAC QUAD,SPLIT,PRESV FREE SYRINGE IM      Previous fasting lab results, cardiovascular risk and specific lipid/LDL/A1c goals reviewed with pt today  Reviewed diet, nutrition, exercise, weight management, BMI/goals. Age/risk based screening recommendations, health maintenance & prevention counseling. Cancer screening USPTFS guidelines reviewed w/ pt today. Discussed benefits/positive/negative outcomes of screening based on age/risk stratification. Informed consent for/against screening based on pt's personal hx/risk factors. Updated in history above and health maintenance. Sees gyn for well woman visits/gyn exams. Pap reportedly negative in 2020 so deferred at her visit 10-21-21 this year. Mammogram scheduled for 12-7-2021. RTC 1 yr for annual fasting checkup. Follow up sooner in the interim prn.

## 2021-12-07 ENCOUNTER — HOSPITAL ENCOUNTER (OUTPATIENT)
Dept: MAMMOGRAPHY | Age: 46
Discharge: HOME | End: 2021-12-07
Attending: SPECIALIST
Payer: COMMERCIAL

## 2021-12-07 ENCOUNTER — TRANSCRIBE ORDER (OUTPATIENT)
Dept: GENERAL RADIOLOGY | Age: 46
End: 2021-12-07

## 2021-12-07 DIAGNOSIS — Z12.31 SCREENING MAMMOGRAM, ENCOUNTER FOR: Primary | ICD-10-CM

## 2021-12-07 DIAGNOSIS — Z12.31 SCREENING MAMMOGRAM, ENCOUNTER FOR: ICD-10-CM

## 2021-12-07 PROCEDURE — 77063 BREAST TOMOSYNTHESIS BI: CPT

## 2022-01-02 DIAGNOSIS — F41.8 DEPRESSION WITH ANXIETY: ICD-10-CM

## 2022-01-02 RX ORDER — DESVENLAFAXINE 100 MG/1
TABLET, EXTENDED RELEASE ORAL
Qty: 90 TABLET | Refills: 0 | Status: SHIPPED | OUTPATIENT
Start: 2022-01-02 | End: 2022-03-28

## 2022-03-19 PROBLEM — G43.109 MIGRAINE WITH AURA AND WITHOUT STATUS MIGRAINOSUS, NOT INTRACTABLE: Status: ACTIVE | Noted: 2018-01-10

## 2022-03-19 PROBLEM — R73.01 IMPAIRED FASTING GLUCOSE: Status: ACTIVE | Noted: 2018-08-08

## 2022-03-19 PROBLEM — M22.2X1 PATELLOFEMORAL STRESS SYNDROME OF RIGHT KNEE: Status: ACTIVE | Noted: 2017-06-07

## 2022-03-19 PROBLEM — E78.2 MIXED HYPERLIPIDEMIA: Status: ACTIVE | Noted: 2021-11-03

## 2022-03-19 PROBLEM — S22.41XA MULTIPLE CLOSED FRACTURES OF RIBS OF RIGHT SIDE: Status: ACTIVE | Noted: 2021-08-30

## 2022-03-20 PROBLEM — E78.00 MILD HYPERCHOLESTEROLEMIA: Status: ACTIVE | Noted: 2018-08-20

## 2022-03-20 PROBLEM — S83.241A TEAR OF MEDIAL MENISCUS OF RIGHT KNEE, CURRENT: Status: ACTIVE | Noted: 2017-06-07

## 2022-03-27 DIAGNOSIS — F41.8 DEPRESSION WITH ANXIETY: ICD-10-CM

## 2022-03-28 RX ORDER — DESVENLAFAXINE 100 MG/1
TABLET, EXTENDED RELEASE ORAL
Qty: 90 TABLET | Refills: 2 | Status: SHIPPED | OUTPATIENT
Start: 2022-03-28

## 2022-09-09 ENCOUNTER — TELEPHONE (OUTPATIENT)
Dept: FAMILY MEDICINE CLINIC | Age: 47
End: 2022-09-09

## 2022-09-09 NOTE — TELEPHONE ENCOUNTER
Patient called stated she has been chatting with provider on [employer] about her weight stated she would have to come into the office. First available is 10/24/2022, patient wants to know if the 10/24/2022 virtual could be change to office visit.     Requesting a call back    Best call back #[phone]

## 2022-09-21 ENCOUNTER — OFFICE VISIT (OUTPATIENT)
Dept: FAMILY MEDICINE CLINIC | Age: 47
End: 2022-09-21
Payer: COMMERCIAL

## 2022-09-21 VITALS
RESPIRATION RATE: 16 BRPM | WEIGHT: 221.6 LBS | HEART RATE: 90 BPM | SYSTOLIC BLOOD PRESSURE: 124 MMHG | OXYGEN SATURATION: 100 % | DIASTOLIC BLOOD PRESSURE: 78 MMHG | TEMPERATURE: 98.2 F | HEIGHT: 68 IN | BODY MASS INDEX: 33.59 KG/M2

## 2022-09-21 DIAGNOSIS — Z76.89 ENCOUNTER FOR WEIGHT MANAGEMENT: Primary | ICD-10-CM

## 2022-09-21 DIAGNOSIS — R73.03 PREDIABETES: ICD-10-CM

## 2022-09-21 DIAGNOSIS — E78.2 MIXED HYPERLIPIDEMIA: ICD-10-CM

## 2022-09-21 DIAGNOSIS — F41.8 DEPRESSION WITH ANXIETY: ICD-10-CM

## 2022-09-21 PROCEDURE — 99214 OFFICE O/P EST MOD 30 MIN: CPT | Performed by: FAMILY MEDICINE

## 2022-09-21 NOTE — PROGRESS NOTES
Chief Complaint   Patient presents with    Weight Management    Request For New Medication       HISTORY OF PRESENT ILLNESS   Patient presents today to discuss weight management and requesting new medication to aide w/ weight loss. Diet/Weight: has struggled w/ her weight for several years; attended [facility] 2017 x 6 months, took Metformin and Phentermine but only lost about 10-15 lbs and didn't really tolerate those well; she is currently doing low carb diet otherwise nothing special; 6-9-21 through 10-1-21 did FATEMEH/ Bruce Do 77 (low calorie, more green veggies, lean protein) but that was not successful either; her weight had been 199-208 lbs from 2019 to 8/2021 but since then her weight has been going up and now in the 220's  Caffeine: 1-2 servings of coffee or Diet Coke a few x a week  Exercise: none for several months due to chronic right foot pain, scheduled for surgery 11-  She has h/o anxiety and depression and her weight has been making her feel down, depressed, low self image  She started seeing a counselor back in 8-2021 which has helped some  She continues on Pristiq 100 mg daily which generally has been keeping her mood stabilized despite some situational stressors that she is working through w her counselor. Otherwise in general she is feeling pretty well. She does have some occ heartburn but it is mostly dietary related  She is prescribed Mobic per Ortho for her foot arthritis  She is scheduled for surgery 11-16-22 and sees me for pre-op 10-24-22     REVIEW OF SYMPTOMS   Review of Systems   Constitutional: Negative. Respiratory: Negative. Cardiovascular: Negative. Gastrointestinal:  Negative for abdominal pain, blood in stool, constipation, diarrhea, melena and vomiting. Neurological: Negative. Endo/Heme/Allergies: Negative.           PROBLEM LIST/MEDICAL HISTORY     Problem List  Date Reviewed: 9/21/2022            Codes Class Noted    Mixed hyperlipidemia ICD-10-CM: E78.2  ICD-9-CM: 272.2  11/3/2021        Multiple closed fractures of ribs of right side ICD-10-CM: S22.41XA  ICD-9-CM: 807.09  8/30/2021    Overview Addendum 8/30/2021  4:16 PM by Champ Sim MD     8-22-21, Fall in kitchen, [place], [place]; Xrays: mildly displaced fractures of the posterior lateral aspects of right 7th, 8th, 9th ribs, no pneumothorax             Mild hypercholesterolemia ICD-10-CM: E78.00  ICD-9-CM: 272.0  8/20/2018        Impaired fasting glucose ICD-10-CM: R73.01  ICD-9-CM: 790.21  8/8/2018    Overview Signed 8/8/2018 10:06 PM by Champ Sim MD     [id number]             Migraine with aura and without status migrainosus, not intractable ICD-10-CM: G43.109  ICD-9-CM: 346.00  1/10/2018    Overview Signed 1/10/2018 11:43 AM by Champ Sim MD     Childhood, 20's, 42; visual aura; no neuro sx             Patellofemoral stress syndrome of right knee ICD-10-CM: M22.2X1  ICD-9-CM: 719.46  6/7/2017        Tear of medial meniscus of right knee, current ICD-10-CM: H03.452N  ICD-9-CM: 836.0  6/7/2017        Mild Thyromegaly w/o cysts or nodules and without obstructive symptoms ICD-10-CM: E01.0  ICD-9-CM: 240.9  12/12/2016    Overview Addendum 1/10/2018 11:36 AM by Champ Sim MD      : Mildly enlarged left thyroid lobe             Vitamin D deficiency ICD-10-CM: E55.9  ICD-9-CM: 268.9  1/29/2014    Overview Signed 1/29/2014  1:33 PM by Champ Sim MD     2011             Mildly Elevated Triglycerides ICD-10-CM: E78.1  ICD-9-CM: 272.1  1/29/2014        Degenerative arthritis of great toe w/ bone spurs ICD-10-CM: M19.079  ICD-9-CM: 715.97  1/29/2014    Overview Signed 1/29/2014  2:06 PM by Champ Sim MD     S/p CSI of toe per foot doctor; surgery is recommended 2014             Breast cyst ICD-10-CM: N60.09  ICD-9-CM: 610.0  Unknown    Overview Signed 3/31/2010  3:43 PM by Riky Barraza, Charu Rahman MD     Excised R Breast : Dr Lena Oglesby             Seasonal allergic rhinitis ICD-10-CM: J30.2  ICD-9-CM: 477.9  Unknown    Overview Signed 3/31/2010  3:44 PM by Luz Celaya MD     Spring/             Depression with anxiety ICD-10-CM: F41.8  ICD-9-CM: 300.4  Unknown        Postpartum depression ICD-10-CM: F53.0  ICD-9-CM: 648.44, 311  2009    Overview Addendum 2014  1:34 PM by Luz Celaya MD     3/18/09  @ 38 wks fetal distress  3/2013 treated again for post partum             C section ICD-9-CM: V45.89  3/18/2009    Overview Signed 3/31/2010  3:41 PM by Luz Celaya MD     GYN Dr. Kassandra Goodwin; Fetal distress                    PAST SURGICAL HISTORY     Past Surgical History:   Procedure Laterality Date    HX BREAST BIOPSY Right     neg    HX  SECTION      HX  SECTION  3/17/2013    HX LAP CHOLECYSTECTOMY  13    Dr Rodriguez All for gallstones    HX ORTHOPAEDIC Right 2014    cheilectomy; right foot, spur, arthritis    MI BREAST SURGERY PROCEDURE UNLISTED       breast right cyst benign         MEDICATIONS     Current Outpatient Medications   Medication Sig    Desvenlafaxine 100 mg Tb24 TAKE 1 TABLET BY MOUTH EVERY DAY    meloxicam (MOBIC) 15 mg tablet Take 15 mg by mouth daily. Takes daily for arthritis pain in foot per [facility]    docosahexaenoic acid/epa (FISH OIL PO) Take  by mouth daily. fluticasone propionate (FLONASE) 50 mcg/actuation nasal spray 2 Sprays by Nasal route. multivitamin (ONE A DAY) tablet Take 1 Tab by mouth daily. No current facility-administered medications for this visit.           ALLERGIES     Allergies   Allergen Reactions    Adhesive Rash and Itching    Adhesive Tape-Silicones Itching and Rash    Sulfa (Sulfonamide Antibiotics) Nausea Only and Other (comments)     dizziness    Wellbutrin [Bupropion Hcl] Other (comments)     Increased agitation, not effective for mood SOCIAL HISTORY     Social History     Tobacco Use    Smoking status: Never    Smokeless tobacco: Never   Substance Use Topics    Alcohol use: Yes     Comment: 1-2 drinks a week     Social History     Social History Narrative    , 2 sons     Teacher at [employer], full time        Diet/Weight: has struggled w/ her weight for several years; attended [facility] ~ 2017 x 6 months, took Metformin and Phentermine but only lost about 10-15 lbs and didn't really tolerate those well; she is currently doing low carb diet otherwise nothing special; 6-9-21 through 10-1-21 did C/ Bruce Do 77 (low calorie, more green veggies, lean protein) but that was not successful either; her weight had been 199-208 lbs from 2019 to 8/2021 but since then her weight has been going up and now in the 220's    Caffeine: 1-2 servings of coffee or Diet Coke a few x a week    Exercise: none for several months due to chronic right foot pain, scheduled for surgery              Social History     Substance and Sexual Activity   Sexual Activity Yes    Partners: Male    Birth control/protection: Condom    Comment: Stopped her BCP 6/2013 d/t fluid retention       IMMUNIZATIONS     Immunization History   Administered Date(s) Administered    COVID-19, MODERNA BLUE border, Primary or Immunocompromised, (age 18y+), IM, 100 mcg/0.5mL 01/26/2021, 02/24/2021    COVID-19, MODERNA Booster BLUE border, (age 18y+), IM, 50mcg/0.25mL 11/09/2021    Influenza Vaccine 10/15/2015, 10/20/2016, 12/30/2017, 09/29/2019, 10/09/2020    Influenza, FLUARIX, FLULAVAL, Ruy Lewis (age 10 mo+) AND AFLURIA, (age 1 y+), PF, 0.5mL 02/05/2015, 10/28/2018, 09/28/2019, 11/03/2021    Influenza, FLUCELVAX, (age 10 mo+), MDCK, PF 12/30/2017    TD Vaccine 04/04/2011    Tdap 07/31/2020         FAMILY HISTORY     Family History   Problem Relation Age of Onset    High Cholesterol Mother     Hypertension Mother     Elevated Lipids Mother     Depression Mother Zoloft    Thyroid Disease Mother     Liver Disease Mother 79        autoimmune hepatitis    Asthma Father     Heart Disease Father         cardiomegaly    Other Sister         good health    Other Brother         good health    Stroke Maternal Grandmother          [de-identified]    Heart Disease Maternal Grandmother     Depression Maternal Grandmother     Heart Disease Maternal Grandfather          80    Dementia Maternal Grandfather     Heart Disease Paternal Grandmother          [de-identified]    Cancer Paternal Grandfather         oral CA pipe smoker,  80's    Attention Deficit Hyperactivity Disorder Son 6        and Dyslexia    No Known Problems Son     Depression Maternal Aunt         2 aunts    Thyroid Disease Maternal Aunt         2 aunts    Depression Maternal Uncle     Thyroid Cancer Neg Hx          VITALS   Visit Vitals  /78 (BP 1 Location: Left upper arm, BP Patient Position: Sitting, BP Cuff Size: Large adult)   Pulse 90   Temp 98.2 °F (36.8 °C) (Oral)   Resp 16   Ht 5' 8\" (1.727 m)   Wt 221 lb 9.6 oz (100.5 kg)   LMP 2022   SpO2 100%   BMI 33.69 kg/m²          PHYSICAL EXAMINATION   Physical Exam  Vitals reviewed. Constitutional:       General: She is not in acute distress. Cardiovascular:      Rate and Rhythm: Normal rate and regular rhythm. Heart sounds: Normal heart sounds. Pulmonary:      Effort: Pulmonary effort is normal.      Breath sounds: Normal breath sounds. Abdominal:      Palpations: Abdomen is soft. Tenderness: There is no abdominal tenderness. Musculoskeletal:         General: No swelling. Cervical back: No tenderness. Right lower leg: No edema. Left lower leg: No edema. Skin:     General: Skin is warm and dry. Neurological:      Mental Status: She is alert. Psychiatric:         Mood and Affect: Mood and affect normal.            ASSESSMENT & PLAN   Diagnoses and all orders for this visit:    1.  Encounter for weight management  - semaglutide (OZEMPIC) 0.25 mg or 0.5 mg/dose (2 mg/1.5 ml) subq pen; 0.25 mg by SubCUTAneous route every seven (7) days. 2. BMI 33.0-33.9,adult  -     semaglutide (OZEMPIC) 0.25 mg or 0.5 mg/dose (2 mg/1.5 ml) subq pen; 0.25 mg by SubCUTAneous route every seven (7) days. 3. Prediabetes    4. Mixed hyperlipidemia    5. Depression with anxiety  Continue Pristiq 100 mg daily, she has refills on file at the pharmacy  Continue meeting routinely w/ her counselor      Reviewed diet, nutrition, exercise, weight management, BMI/goals. Weight loss medication counseling at length w/ pt today including options, medication effects, mechanisms of action, risks/benefits, possible side effects and precautions. She would like to proceed w/ trial of Ozempic. Start 0.25 mg weekly x 4 weeks then titrate to 0.5 mg weekly thereafter. Pregnancy avoidance discussed. They use condoms and withdrawal and she has no desire for pregnancy and is aware of potential risks. She is already scheduled for pre-op w/ me 10-24-22 for upcoming foot surgery on 11-16-22. We will do a medication reassessment at that time and arrange for fasting blood work to be done along w/ her preop labs at that time.

## 2022-09-21 NOTE — PROGRESS NOTES
Chief Complaint   Patient presents with    Request For New Medication    Weight Management     1. \"Have you been to the ER, urgent care clinic since your last visit? Hospitalized since your last visit? \" No    2. \"Have you seen or consulted any other health care providers outside of the [address] [place] [address] since your last visit? \" No     3. For patients aged 39-70: Has the patient had a colonoscopy / FIT/ Cologuard? No      If the patient is female:    4. For patients aged 41-77: Has the patient had a mammogram within the past 2 years? Yes - no Care Gap present 12/2021      5. For patients aged 21-65: Has the patient had a pap smear?  Gyn Dr Otilia Dawson

## 2022-10-24 ENCOUNTER — OFFICE VISIT (OUTPATIENT)
Dept: FAMILY MEDICINE CLINIC | Age: 47
End: 2022-10-24
Payer: COMMERCIAL

## 2022-10-24 VITALS
WEIGHT: 217.4 LBS | HEART RATE: 94 BPM | RESPIRATION RATE: 16 BRPM | BODY MASS INDEX: 32.95 KG/M2 | TEMPERATURE: 98.4 F | OXYGEN SATURATION: 98 % | DIASTOLIC BLOOD PRESSURE: 74 MMHG | SYSTOLIC BLOOD PRESSURE: 112 MMHG | HEIGHT: 68 IN

## 2022-10-24 DIAGNOSIS — Z76.89 ENCOUNTER FOR WEIGHT MANAGEMENT: ICD-10-CM

## 2022-10-24 DIAGNOSIS — Z23 ENCOUNTER FOR IMMUNIZATION: ICD-10-CM

## 2022-10-24 DIAGNOSIS — Z23 NEEDS FLU SHOT: ICD-10-CM

## 2022-10-24 DIAGNOSIS — M19.079 1ST MTP ARTHRITIS: ICD-10-CM

## 2022-10-24 DIAGNOSIS — R73.03 PREDIABETES: ICD-10-CM

## 2022-10-24 DIAGNOSIS — Z01.818 PREOP EXAMINATION: Primary | ICD-10-CM

## 2022-10-24 DIAGNOSIS — E78.2 MIXED HYPERLIPIDEMIA: ICD-10-CM

## 2022-10-24 PROCEDURE — 99214 OFFICE O/P EST MOD 30 MIN: CPT | Performed by: FAMILY MEDICINE

## 2022-10-24 PROCEDURE — 90471 IMMUNIZATION ADMIN: CPT | Performed by: FAMILY MEDICINE

## 2022-10-24 PROCEDURE — 90686 IIV4 VACC NO PRSV 0.5 ML IM: CPT | Performed by: FAMILY MEDICINE

## 2022-10-24 RX ORDER — OMEPRAZOLE 20 MG/1
20 TABLET, DELAYED RELEASE ORAL DAILY
COMMUNITY

## 2022-10-24 NOTE — PROGRESS NOTES
Chief Complaint   Patient presents with    Pre-op Exam     Right 1st MTP Fusion on 11/16/22 by Dr. Jayna VOGEL  PRE-OP H&P    Surgery: Right 1st MTP Fusion    Date of Surgery: 11-    Surgeon: Dr. Deloris Hayes    Anesthesia: Apple Nguyễn with IV Sedation    Latex Allergy: No    Allergy or Reaction to Tapes or Adhesives: Local Reaction to Surgical Tape: Rash, Itching     History of Reactions to Anesthesia: No    History of Heart Disease/CAD/PTCA/CABG: No    ASA: No    Coumadin/Xarelto/Eliquis/Pradaxa: No    Nsaid's: Mobic 15 mg    Asthma/COPD/Pulmonary Disease: No    Bleeding/Bruising or Clotting Disorder: No           REVIEW OF SYMPTOMS   Review of Systems   Constitutional: Negative. Negative for chills and fever. HENT: Negative. Respiratory: Negative. Cardiovascular: Negative. Gastrointestinal:  Negative for abdominal pain, blood in stool, constipation, diarrhea, melena, nausea and vomiting. Got slight heartburn and indigestion after starting on Ozempic for weight management 4 weeks ago, started taking Prilosec OTC and that has helped a lot so far   Genitourinary: Negative. Neurological: Negative. Endo/Heme/Allergies: Negative. Psychiatric/Behavioral: Negative.            PROBLEM LIST/MEDICAL HISTORY     Problem List  Date Reviewed: 10/24/2022            Codes Class Noted    Mixed hyperlipidemia ICD-10-CM: E78.2  ICD-9-CM: 272.2  11/3/2021        Multiple closed fractures of ribs of right side ICD-10-CM: S22.41XA  ICD-9-CM: 807.09  8/30/2021    Overview Addendum 8/30/2021  4:16 PM by Kina Echevarria MD     8-22-21, Fall in kitchen, [facility] [place], [place]; Xrays: mildly displaced fractures of the posterior lateral aspects of right 7th, 8th, 9th ribs, no pneumothorax             Impaired fasting glucose ICD-10-CM: R73.01  ICD-9-CM: 790.21  8/8/2018    Overview Signed 8/8/2018 10:06 PM by Kina Echevarria MD              Migraine with aura and without status migrainosus, not intractable ICD-10-CM: G43.109  ICD-9-CM: 346.00  1/10/2018    Overview Signed 1/10/2018 11:43 AM by Alexis Monge MD     Childhood, 20's, 42; visual aura; no neuro sx             Patellofemoral stress syndrome of right knee ICD-10-CM: M22.2X1  ICD-9-CM: 719.46  2017        Tear of medial meniscus of right knee, current ICD-10-CM: P57.117H  ICD-9-CM: 836.0  2017        Mild Thyromegaly w/o cysts or nodules and without obstructive symptoms ICD-10-CM: E01.0  ICD-9-CM: 240.9  2016    Overview Addendum 1/10/2018 11:36 AM by Alexis Monge MD      : Mildly enlarged left thyroid lobe             Vitamin D deficiency ICD-10-CM: E55.9  ICD-9-CM: 268.9  2014    Overview Signed 2014  1:33 PM by Alexis Monge MD                  Degenerative arthritis of great toe w/ bone spurs ICD-10-CM: M19.079  ICD-9-CM: 715.97  2014    Overview Signed 2014  2:06 PM by Alexis Monge MD     S/p CSI of toe per foot doctor; surgery is recommended              Seasonal allergic rhinitis ICD-10-CM: J30.2  ICD-9-CM: 477.9  Unknown    Overview Signed 3/31/2010  3:44 PM by Alexis Monge MD     Spring/fall             Depression with anxiety ICD-10-CM: F41.8  ICD-9-CM: 300.4  Unknown        Postpartum depression ICD-10-CM: F53.0  ICD-9-CM: 648.44, 311  2009    Overview Addendum 2014  1:34 PM by Alexis Monge MD     3/18/09  @ 38 wks fetal distress  3/2013 treated again for post partum                    PAST SURGICAL HISTORY     Past Surgical History:   Procedure Laterality Date    HX BREAST BIOPSY Right 2009    Benign breast cyst removed, Dr. Renate Seip      HX  SECTION  2013    HX CYST REMOVAL  2009    Sebaceous Cyst, Dr. Kayla Smith [place] [id number]  2013    Dr Saul Alexander for gallstones HX ORTHOPAEDIC Right 05/21/2014    Cheilectomy; right foot, spur, arthritis         MEDICATIONS     Current Outpatient Medications   Medication Sig    omeprazole (PriLOSEC OTC) 20 mg tablet Take 20 mg by mouth daily. Since ~10-1-22    semaglutide (OZEMPIC) 0.25 mg or 0.5 mg/dose (2 mg/1.5 ml) subq pen 0.25 mg by SubCUTAneous route every seven (7) days. Desvenlafaxine 100 mg Tb24 TAKE 1 TABLET BY MOUTH EVERY DAY    meloxicam (MOBIC) 15 mg tablet Take 15 mg by mouth daily. Takes daily for arthritis pain in foot per [facility] Ayon    docosahexaenoic acid/epa (FISH OIL PO) Take  by mouth daily. multivitamin (ONE A DAY) tablet Take 1 Tab by mouth daily. fluticasone propionate (FLONASE) 50 mcg/actuation nasal spray 2 Sprays by Nasal route. (Patient not taking: Reported on 10/24/2022)     No current facility-administered medications for this visit.           ALLERGIES     Allergies   Allergen Reactions    Adhesive Rash and Itching    Sulfa (Sulfonamide Antibiotics) Nausea Only and Other (comments)     dizziness    Wellbutrin [Bupropion Hcl] Other (comments)     Increased agitation, not effective for mood          SOCIAL HISTORY     Social History     Tobacco Use    Smoking status: Never    Smokeless tobacco: Never   Substance Use Topics    Alcohol use: Yes     Comment: 1-2 drinks a week     Social History     Social History Narrative    , 2 sons who [facility] [employer]    Teacher at [employer], full time        Diet/Weight: has struggled w/ her weight for several years; attended [facility] ~ 2017 x 6 months, took Metformin and Phentermine but only lost about 10-15 lbs and didn't really tolerate those well; she is currently doing low carb diet otherwise nothing special; 6-9-21 through 10-1-21 did FATEMEH/ [employer] (low calorie, more green veggies, lean protein) but that was not successful either; her weight had been 199-208 lbs from 2019 to 8/2021 but since then her weight has been going up and now in the 220's; started Ozempic 24    Caffeine: 1-2 servings of coffee or Diet Coke a few x a week    Exercise: none for several months due to chronic right foot pain, scheduled for surgery 2022             Social History     Substance and Sexual Activity   Sexual Activity Yes    Partners: Male    Birth control/protection: Condom    Comment: Stopped her BCP 2013 d/t fluid retention       IMMUNIZATIONS     Immunization History   Administered Date(s) Administered    COVID-19, MODERNA BLUE border, Primary or Immunocompromised, (age 18y+), IM, 100 mcg/0.5mL 2021, 2021    COVID-19, MODERNA Booster BLUE border, (age 18y+), IM, 50mcg/0.25mL 2021    Influenza Vaccine 10/15/2015, 10/20/2016, 2017, 2019, 10/09/2020    Influenza, FLUARIX, FLULAVAL, [address] (age 10 mo+) AND AFLURIA, (age 1 y+), PF, 0.5mL 2015, 10/28/2018, 2019, 2021    Influenza, FLUCELVAX, (age 10 mo+), MDCK, PF 2017    TD Vaccine 2011    Tdap 2020         FAMILY HISTORY     Family History   Problem Relation Age of Onset    Heart Attack Mother 67    High Cholesterol Mother     Hypertension Mother     Elevated Lipids Mother     Depression Mother         Zoloft    Thyroid Disease Mother     Liver Disease Mother 79        autoimmune hepatitis    Asthma Father     Heart Disease Father         cardiomegaly    Other Sister         good health    Other Brother         good health    Stroke Maternal Grandmother          [de-identified]    Heart Disease Maternal Grandmother     Depression Maternal Grandmother     Heart Attack Maternal Grandfather         MI early 66's,  age 80    Heart Disease Maternal Grandfather          80    Dementia Maternal Grandfather     Heart Disease Paternal Grandmother          [de-identified]    Cancer Paternal Grandfather         oral CA pipe smoker,  80's    Attention Deficit Hyperactivity Disorder Son 6        and Dyslexia    No Known Problems Son     Depression Maternal Aunt         2 aunts    Thyroid Disease Maternal Aunt         2 aunts    Depression Maternal Uncle     Thyroid Cancer Neg Hx     Anesth Problems Neg Hx          VITALS   Visit Vitals  /74 (BP 1 Location: Left upper arm, BP Patient Position: Sitting, BP Cuff Size: Large adult)   Pulse 94   Temp 98.4 °F (36.9 °C) (Oral)   Resp 16   Ht 5' 8\" (1.727 m)   Wt 217 lb 6.4 oz (98.6 kg)   LMP 10/24/2022   SpO2 98%   BMI 33.06 kg/m²          PHYSICAL EXAMINATION   Physical Exam  Vitals reviewed. Constitutional:       General: She is not in acute distress. Appearance: Normal appearance. She is not ill-appearing. HENT:      Right Ear: Tympanic membrane normal.      Left Ear: Tympanic membrane normal.      Mouth/Throat:      Mouth: Mucous membranes are moist.      Pharynx: Oropharynx is clear. No oropharyngeal exudate or posterior oropharyngeal erythema. Eyes:      Extraocular Movements: Extraocular movements intact. Conjunctiva/sclera: Conjunctivae normal.   Neck:      Thyroid: No thyroid tenderness. Vascular: No carotid bruit. Cardiovascular:      Rate and Rhythm: Normal rate and regular rhythm. Heart sounds: Normal heart sounds. No murmur heard. No gallop. Pulmonary:      Effort: Pulmonary effort is normal.      Breath sounds: Normal breath sounds. Abdominal:      Palpations: Abdomen is soft. Tenderness: There is no abdominal tenderness. Musculoskeletal:         General: No swelling or tenderness. Cervical back: Full passive range of motion without pain. No tenderness. Right lower leg: No edema. Left lower leg: No edema. Lymphadenopathy:      Cervical: No cervical adenopathy. Skin:     General: Skin is warm and dry. Neurological:      General: No focal deficit present. Mental Status: She is alert and oriented to person, place, and time. Mental status is at baseline. Cranial Nerves: No cranial nerve deficit.       Gait: Gait normal.   Psychiatric:         Mood and Affect: Mood normal.              LABORATORY DATA/ANCILLARY/IMAGING     Results for orders placed or performed in visit on 11/03/21   AMB POC HEMOGLOBIN A1C   Result Value Ref Range    Hemoglobin A1c (POC) 5.1 %       Lab Results   Component Value Date/Time    Cholesterol, total 200 (H) 08/26/2021 09:16 AM    HDL Cholesterol 53 08/26/2021 09:16 AM    LDL, calculated 113.4 (H) 08/26/2021 09:16 AM    Triglyceride 168 (H) 08/26/2021 09:16 AM    CHOL/HDL Ratio 3.8 08/26/2021 09:16 AM     Lab Results   Component Value Date/Time    TSH 1.67 08/26/2021 09:16 AM    T4, Free 1.20 08/13/2018 09:00 AM          ASSESSMENT & PLAN   Diagnoses and all orders for this visit:    1. Preop H&P:    Surgery: Right 1st MTP Fusion    Date of Surgery: 11-    Surgeon: Dr. Jill Aquino    Anesthesia: Kevin Pancoast with IV Sedation    Requested Pre-Op Labs:  -     CBC W/O DIFF; Future  -     METABOLIC PANEL, BASIC; Future  -     PROTHROMBIN TIME + INR; Future    Based on today's H&P, medically stable and cleared for planned procedure. Acceptable risk for surgery. Hold Mobic and Fish Oil 7 days prior to surgery  Further recommendations if needed pending review of today's pre-op labs  Will fax today's office note along w/ labs when available in the next few days    2. Right 1st MTP arthritis    3. Prediabetes  Controlling w/ diet  Also new to Ozempic for weight management  -     HEMOGLOBIN A1C WITH EAG; Future    4. Mixed hyperlipidemia  Working on dietary modification alone for now  Will advance exercise post -op after cleared by surgeon    5. BMI 33.0-33.9,adult  Reviewed diet, nutrition, exercise, weight management, BMI, lipid goals. Medication management, see  below.     6. Encounter for weight management  New to Ozempic 9-  Titrate Ozempic from 0.25 mg weekly to 0.5 mg weekly then reassess again at 4 weeks  She will message me during that time before the rx runs out so that we can determine maintaining at that regimen or further titration up to 1 mg weekly until next follow up assessment ~ 6-8 weeks later depending on progress  Reviewed medication, effects, risks/benefits, and possible side effects  Reviewed diet, nutrition, exercise, weight management, BMI, lipid goals. 7. Encounter for immunization  -     [place] IMMUNIZ ADMIN,1 SINGLE/COMB VAC/TOXOID  -     INFLUENZA, FLUARIX, FLULAVAL, FLUZONE (AGE 6 MO+), AFLURIA(AGE 3Y+) IM, PF, 0.5 ML    8.  Needs flu shot  -     [place] IMMUNIZ ADMIN,1 SINGLE/COMB VAC/TOXOID  -     INFLUENZA, FLUARIX, FLULAVAL, FLUZONE (AGE 6 MO+), AFLURIA(AGE 3Y+) IM, PF, 0.5 ML

## 2022-10-24 NOTE — PROGRESS NOTES
Chief Complaint   Patient presents with    Pre-op Exam     Right 1st MTP for 11/16 by Dr Luis Alberto Tilley     1. \"Have you been to the ER, urgent care clinic since your last visit? Hospitalized since your last visit? \" No    2. \"Have you seen or consulted any other health care providers outside of the [address] [place] [address] since your last visit? \" Ortho Dr Luis Alberto Tilley    3. For patients aged 39-70: Has the patient had a colonoscopy / FIT/ Cologuard? No      If the patient is female:    4. For patients aged 41-77: Has the patient had a mammogram within the past 2 years? Yes - no Care Gap present      5. For patients aged 21-65: Has the patient had a pap smear?  Gyn Dr Lolly March

## 2022-10-25 LAB
ANION GAP SERPL CALC-SCNC: 4 MMOL/L (ref 5–15)
BUN SERPL-MCNC: 12 MG/DL (ref 6–20)
BUN/CREAT SERPL: 21 (ref 12–20)
CALCIUM SERPL-MCNC: 9 MG/DL (ref 8.5–10.1)
CHLORIDE SERPL-SCNC: 106 MMOL/L (ref 97–108)
CO2 SERPL-SCNC: 28 MMOL/L (ref 21–32)
CREAT SERPL-MCNC: 0.57 MG/DL (ref 0.55–1.02)
ERYTHROCYTE [DISTWIDTH] IN BLOOD BY AUTOMATED COUNT: 12.5 % (ref 11.5–14.5)
EST. AVERAGE GLUCOSE BLD GHB EST-MCNC: 105 MG/DL
GLUCOSE SERPL-MCNC: 82 MG/DL (ref 65–100)
HBA1C MFR BLD: 5.3 % (ref 4–5.6)
HCT VFR BLD AUTO: 42.6 % (ref 35–47)
HGB BLD-MCNC: 13.9 G/DL (ref 11.5–16)
INR PPP: 1 (ref 0.9–1.1)
MCH RBC QN AUTO: 30.9 PG (ref 26–34)
MCHC RBC AUTO-ENTMCNC: 32.6 G/DL (ref 30–36.5)
MCV RBC AUTO: 94.7 FL (ref 80–99)
NRBC # BLD: 0 K/UL (ref 0–0.01)
NRBC BLD-RTO: 0 PER 100 WBC
PLATELET # BLD AUTO: 335 K/UL (ref 150–400)
PMV BLD AUTO: 9.4 FL (ref 8.9–12.9)
POTASSIUM SERPL-SCNC: 4.7 MMOL/L (ref 3.5–5.1)
PROTHROMBIN TIME: 10 SEC (ref 9–11.1)
RBC # BLD AUTO: 4.5 M/UL (ref 3.8–5.2)
SODIUM SERPL-SCNC: 138 MMOL/L (ref 136–145)
WBC # BLD AUTO: 6.2 K/UL (ref 3.6–11)

## 2022-11-13 DIAGNOSIS — Z76.89 ENCOUNTER FOR WEIGHT MANAGEMENT: ICD-10-CM

## 2022-11-14 NOTE — TELEPHONE ENCOUNTER
Patient mychart request for refill and stated:       Patient Comment: I want to stick with the 0.5 dose for another month since I am about to have the foot surgery. Last Visit: 10/24/22 MD PINO  Next Appointment: None  Previous Refill Encounter(s): 22 1 + 1 (RX noted 0.25 or 0.5mg)- Updated to 0.5 mg for correct d/s if appropriate. Thanks, Tran    Requested Prescriptions     Pending Prescriptions Disp Refills    semaglutide (OZEMPIC) 0.25 mg or 0.5 mg/dose (2 mg/1.5 ml) subq pen 1 Box 1     Si.5 mg by SubCUTAneous route every seven (7) days. For [facility] in place:   Recommendation Provided To:    Intervention Detail: New Rx: 1, reason: Patient Preference  Gap Closed?:   Intervention Accepted By:   Time Spent (min): 5

## 2022-12-27 DIAGNOSIS — F41.8 DEPRESSION WITH ANXIETY: ICD-10-CM

## 2022-12-28 RX ORDER — DESVENLAFAXINE 100 MG/1
TABLET, EXTENDED RELEASE ORAL
Qty: 90 TABLET | Refills: 1 | Status: SHIPPED | OUTPATIENT
Start: 2022-12-28

## 2023-01-29 DIAGNOSIS — Z76.89 ENCOUNTER FOR WEIGHT MANAGEMENT: ICD-10-CM

## 2023-01-30 RX ORDER — SEMAGLUTIDE 1.34 MG/ML
0.5 INJECTION, SOLUTION SUBCUTANEOUS
Qty: 1 BOX | Refills: 0 | Status: SHIPPED | OUTPATIENT
Start: 2023-01-30

## 2023-01-31 NOTE — TELEPHONE ENCOUNTER
Sent in rx renewal.   Due follow up med check. Nothing scheduled. Please get booked as soon as possible.

## 2023-02-09 NOTE — TELEPHONE ENCOUNTER
TAMIKO for return call to to set up an appt. Told her that I would send a [employer] message and she could respond thru there.

## 2023-03-07 ENCOUNTER — OFFICE VISIT (OUTPATIENT)
Dept: FAMILY MEDICINE CLINIC | Age: 48
End: 2023-03-07
Payer: COMMERCIAL

## 2023-03-07 VITALS
WEIGHT: 208.8 LBS | RESPIRATION RATE: 16 BRPM | HEART RATE: 93 BPM | HEIGHT: 68 IN | SYSTOLIC BLOOD PRESSURE: 116 MMHG | OXYGEN SATURATION: 98 % | DIASTOLIC BLOOD PRESSURE: 64 MMHG | BODY MASS INDEX: 31.64 KG/M2 | TEMPERATURE: 98.2 F

## 2023-03-07 DIAGNOSIS — R73.03 PREDIABETES: ICD-10-CM

## 2023-03-07 DIAGNOSIS — Z76.89 ENCOUNTER FOR WEIGHT MANAGEMENT: Primary | ICD-10-CM

## 2023-03-07 PROCEDURE — 99213 OFFICE O/P EST LOW 20 MIN: CPT | Performed by: FAMILY MEDICINE

## 2023-03-07 RX ORDER — SEMAGLUTIDE 1.34 MG/ML
1 INJECTION, SOLUTION SUBCUTANEOUS
Qty: 1 BOX | Refills: 2 | Status: SHIPPED | OUTPATIENT
Start: 2023-03-07

## 2023-03-07 NOTE — PROGRESS NOTES
Chief Complaint   Patient presents with    Weight Management     3 month follow up    Medication Evaluation     Ozempic       HISTORY OF PRESENT ILLNESS   HPI  Patient with history of high BMI and prediabetes presents for 3 month follow up weight management and medication evaluation on Ozempic. She went off of it after her right foot surgery in  then restarted it 1-1-23 at the 0.25 mg weekly dose x 4 weeks then since ~ 2-1-23 titrated up to the 0.5 mg weekly dose and has been on that for about 4-5 weeks now and tolerating well. Diet/Weight: has struggled w/ her weight for several years; [facility] x 6 months, took Metformin and Phentermine but only lost about 10-15 lbs and didn't really tolerate those well; she is currently doing low carb diet otherwise nothing special; 6-9-21 through 10-1-21 did C/ Bruce Do 77 (low calorie, more green veggies, lean protein) but that was not successful either; her weight had been 199-208 lbs from 2019 to 8/2021 but since then her weight had been going up and staying in the 220's; started Ozempic 9-21-22; down to 208 lbs as of 3-2023; personal goal 170 lbs    Caffeine: 1-2 servings of coffee or Diet Coke a few x a week    Exercise: right foot surgery 11-; doing PT, now down to 1 x a week; since 2-2023 walks new puppy 2 x a day x 15 minutes; plans to re-build gradually back at gym once cleared by PT 3-2023     REVIEW OF SYMPTOMS   Review of Systems   Constitutional: Negative. Respiratory: Negative. Cardiovascular: Negative. Gastrointestinal:  Negative for abdominal pain, constipation, diarrhea, nausea and vomiting. Genitourinary: Negative. Neurological: Negative. Endo/Heme/Allergies: Negative.           PROBLEM LIST/MEDICAL HISTORY     Problem List  Date Reviewed: 3/7/2023            Codes Class Noted    Mixed hyperlipidemia ICD-10-CM: E78.2  ICD-9-CM: 272.2  11/3/2021        Multiple closed fractures of ribs of right side ICD-10-CM: S22.41XA  ICD-9-CM: 807.09  8/30/2021    Overview Addendum 8/30/2021  4:16 PM by Richelle Ospina MD     8-22-21, Fall in kitchen, [place], [place]; Xrays: mildly displaced fractures of the posterior lateral aspects of right 7th, 8th, 9th ribs, no pneumothorax             Impaired fasting glucose ICD-10-CM: R73.01  ICD-9-CM: 790.21  8/8/2018    Overview Signed 8/8/2018 10:06 PM by Richelle Ospina MD     [id number]             Migraine with aura and without status migrainosus, not intractable ICD-10-CM: G43.109  ICD-9-CM: 346.00  1/10/2018    Overview Signed 1/10/2018 11:43 AM by Richelle Ospina MD     Childhood, 20's, 42; visual aura; no neuro sx             Patellofemoral stress syndrome of right knee ICD-10-CM: M22.2X1  ICD-9-CM: 719.46  6/7/2017        Tear of medial meniscus of right knee, current ICD-10-CM: H96.749V  ICD-9-CM: 836.0  6/7/2017        Mild Thyromegaly w/o cysts or nodules and without obstructive symptoms ICD-10-CM: E01.0  ICD-9-CM: 240.9  12/12/2016    Overview Addendum 1/10/2018 11:36 AM by Richelle Ospina MD      : Mildly enlarged left thyroid lobe             Vitamin D deficiency ICD-10-CM: E55.9  ICD-9-CM: 268.9  1/29/2014    Overview Signed 1/29/2014  1:33 PM by Richelle Ospina MD     2011             Degenerative arthritis of great toe w/ bone spurs ICD-10-CM: M19.079  ICD-9-CM: 715.97  1/29/2014    Overview Signed 1/29/2014  2:06 PM by Richelle Ospina MD     S/p CSI of toe per foot doctor; surgery is recommended 2014             Seasonal allergic rhinitis ICD-10-CM: J30.2  ICD-9-CM: 477.9  Unknown    Overview Signed 3/31/2010  3:44 PM by Richelle Ospina MD     Spring/fall             Depression with anxiety ICD-10-CM: F41.8  ICD-9-CM: 300.4  Unknown        Postpartum depression ICD-10-CM: F53.0  ICD-9-CM: 648.44, 664  4/2/2009    Overview Addendum 1/29/2014  1:34 PM by Cayetano Garcia, Jess Suárez MD     3/18/09  @ 38 wks fetal distress  3/2013 treated again for post partum                    PAST SURGICAL HISTORY     Past Surgical History:   Procedure Laterality Date    HX BREAST BIOPSY Right     Benign breast cyst removed, Dr. Stephanie Mott      HX  SECTION  2013    HX CYST REMOVAL  2009    Sebaceous Cyst, Dr. Chintan Lovelace    HX [address]  2013    Dr Dayana Murguia for gallstones    HX ORTHOPAEDIC Right 2014    Cheilectomy; right foot, spur, arthritis    HX ORTHOPAEDIC Right 2022    Right 1st MTP Fusion, Dr. Emil Ghotra     Current Outpatient Medications   Medication Sig    semaglutide (Ozempic) 0.25 mg or 0.5 mg/dose (2 mg/1.5 ml) subq pen 0.5 mg by SubCUTAneous route every seven (7) days. Desvenlafaxine 100 mg Tb24 TAKE 1 TABLET BY MOUTH EVERY DAY    omeprazole (PRILOSEC OTC) 20 mg tablet Take 20 mg by mouth daily as needed. Since ~10-1-22    docosahexaenoic acid/epa (FISH OIL PO) Take  by mouth daily. fluticasone propionate (FLONASE) 50 mcg/actuation nasal spray 2 Sprays by Nasal route. multivitamin (ONE A DAY) tablet Take 1 Tab by mouth daily. No current facility-administered medications for this visit.           ALLERGIES     Allergies   Allergen Reactions    Adhesive Rash and Itching    Sulfa (Sulfonamide Antibiotics) Nausea Only and Other (comments)     dizziness    Wellbutrin [Bupropion Hcl] Other (comments)     Increased agitation, not effective for mood          SOCIAL HISTORY     Social History     Tobacco Use    Smoking status: Never    Smokeless tobacco: Never   Substance Use Topics    Alcohol use: Yes     Comment: 1-2 drinks a week     Social History     Social History Narrative    , 2 sons who attend [employer]    Teacher at [employer], full time        Diet/Weight: has struggled w/ her weight for several years; [facility] x 6 months, took Metformin and Phentermine but only lost about 10-15 lbs and didn't really tolerate those well; she is currently doing low carb diet otherwise nothing special; 6-9-21 through 10-1-21 did FATEMEH/ Bruce Horowitz (low calorie, more green veggies, lean protein) but that was not successful either; her weight had been 199-208 lbs from 2019 to 8/2021 but since then her weight had been going up and staying in the 220's; started Ozempic 9-21-22; down to 208 lbs as of 3-2023; personal goal 170 lbs        Caffeine: 1-2 servings of coffee or Diet Coke a few x a week        Exercise: right foot surgery 11-; doing PT, now down to 1 x a week; since 2-2023 walks new puppy 2 x a day x 15 minutes; plans to re-build gradually back at gym once cleared by PT 3-2023             Social History     Substance and Sexual Activity   Sexual Activity Yes    Partners: Male    Birth control/protection: Condom    Comment: Stopped her BCP 6/2013 d/t fluid retention       IMMUNIZATIONS     Immunization History   Administered Date(s) Administered    COVID-19, MODERNA [employer] border, Primary or Immunocompromised, (age 18y+), IM, 100 mcg/0.5mL 01/26/2021, 02/24/2021    COVID-19, MODERNA Booster BLUE border, (age 18y+), IM, 50mcg/0.25mL 11/09/2021    Influenza Vaccine 10/15/2015, 10/20/2016, 12/30/2017, 09/29/2019, 10/09/2020    Influenza, FLUARIX, FLULAVAL, Antione  (age 10 mo+) AND AFLURIA, (age 1 y+), PF, 0.5mL 02/05/2015, 10/28/2018, 09/28/2019, 11/03/2021, 10/24/2022    Influenza, FLUCELVAX, (age 10 mo+), MDCK, PF 12/30/2017    TD Vaccine 04/04/2011    Tdap 07/31/2020         FAMILY HISTORY     Family History   Problem Relation Age of Onset    Heart Attack Mother 67    High Cholesterol Mother     Hypertension Mother     Elevated Lipids Mother     Depression Mother         Zoloft    Thyroid Disease Mother     Liver Disease Mother 79        autoimmune hepatitis    Asthma Father     Heart Disease Father         cardiomegaly    Other Sister         good health    Other Brother         good health    Stroke Maternal Grandmother          [de-identified]    Heart Disease Maternal Grandmother     Depression Maternal Grandmother     Heart Attack Maternal Grandfather         MI early 66's,  age 80    Heart Disease Maternal Grandfather          80    Dementia Maternal Grandfather     Heart Disease Paternal Grandmother          [de-identified]    Cancer Paternal Grandfather         oral CA pipe smoker,  80's    Attention Deficit Hyperactivity Disorder Son 6        and Dyslexia    No Known Problems Son     Depression Maternal Aunt         2 aunts    Thyroid Disease Maternal Aunt         2 aunts    Depression Maternal Uncle     Thyroid Cancer Neg Hx     Anesth Problems Neg Hx          VITALS   Visit Vitals  /64 (BP 1 Location: Left upper arm, BP Patient Position: Sitting, BP Cuff Size: Large adult)   Pulse 93   Temp 98.2 °F (36.8 °C) (Oral)   Resp 16   Ht 5' 8\" (1.727 m)   Wt 208 lb 12.8 oz (94.7 kg)   LMP 02/10/2023   SpO2 98%   BMI 31.75 kg/m²          PHYSICAL EXAMINATION   Physical Exam  Vitals reviewed. Constitutional:       General: She is not in acute distress. Cardiovascular:      Rate and Rhythm: Regular rhythm. Heart sounds: No murmur heard. Pulmonary:      Effort: Pulmonary effort is normal.      Breath sounds: Normal breath sounds. Abdominal:      General: There is no distension. Palpations: Abdomen is soft. Tenderness: There is no abdominal tenderness. Skin:     General: Skin is warm and dry.               LABORATORY DATA/ANCILLARY/IMAGING     Results for orders placed or performed in visit on 10/24/22   HEMOGLOBIN A1C WITH EAG   Result Value Ref Range    Hemoglobin A1c 5.3 4.0 - 5.6 %    Est. average glucose 105 mg/dL   PROTHROMBIN TIME + INR   Result Value Ref Range    INR 1.0 0.9 - 1.1      Prothrombin time 10.0 9.0 - 57.6 sec   METABOLIC PANEL, BASIC   Result Value Ref Range    Sodium 138 136 - 145 mmol/L    Potassium 4.7 3.5 - 5.1 mmol/L    Chloride 106 97 - 108 mmol/L    CO2 28 21 - 32 mmol/L    Anion gap 4 (L) 5 - 15 mmol/L    Glucose 82 65 - 100 mg/dL    BUN 12 6 - 20 MG/DL    Creatinine 0.57 0.55 - 1.02 MG/DL    BUN/Creatinine ratio 21 (H) 12 - 20      eGFR >60 >60 ml/min/1.73m2    Calcium 9.0 8.5 - 10.1 MG/DL   CBC W/O DIFF   Result Value Ref Range    WBC 6.2 3.6 - 11.0 K/uL    RBC 4.50 3.80 - 5.20 M/uL    HGB 13.9 11.5 - 16.0 g/dL    HCT 42.6 35.0 - 47.0 %    MCV 94.7 80.0 - 99.0 FL    MCH 30.9 26.0 - 34.0 PG    MCHC 32.6 30.0 - 36.5 g/dL    RDW 12.5 11.5 - 14.5 %    PLATELET 876 184 - 724 K/uL    MPV 9.4 8.9 - 12.9 FL    NRBC 0.0 0  WBC    ABSOLUTE NRBC 0.00 0.00 - 0.01 K/uL       Lab Results   Component Value Date/Time    Cholesterol, total 200 (H) 08/26/2021 09:16 AM    HDL Cholesterol 53 08/26/2021 09:16 AM    LDL, calculated 113.4 (H) 08/26/2021 09:16 AM    Triglyceride 168 (H) 08/26/2021 09:16 AM    CHOL/HDL Ratio 3.8 08/26/2021 09:16 AM     Lab Results   Component Value Date/Time    TSH 1.67 08/26/2021 09:16 AM    T4, Free 1.20 08/13/2018 09:00 AM      Lab Results   Component Value Date/Time    Sodium 138 10/24/2022 01:09 PM    Potassium 4.7 10/24/2022 01:09 PM    Chloride 106 10/24/2022 01:09 PM    CO2 28 10/24/2022 01:09 PM    Anion gap 4 (L) 10/24/2022 01:09 PM    Glucose 82 10/24/2022 01:09 PM    BUN 12 10/24/2022 01:09 PM    Creatinine 0.57 10/24/2022 01:09 PM    BUN/Creatinine ratio 21 (H) 10/24/2022 01:09 PM    GFR est AA >60 08/26/2021 09:16 AM    GFR est non-AA >60 08/26/2021 09:16 AM    Calcium 9.0 10/24/2022 01:09 PM    Bilirubin, total 0.6 08/26/2021 09:16 AM    ALT (SGPT) 57 08/26/2021 09:16 AM    Alk. phosphatase 88 08/26/2021 09:16 AM    Protein, total 7.1 08/26/2021 09:16 AM    Albumin 3.8 08/26/2021 09:16 AM    Globulin 3.3 08/26/2021 09:16 AM    A-G Ratio 1.2 08/26/2021 09:16 AM          ASSESSMENT & PLAN   Diagnoses and all orders for this visit:    1.  Encounter for weight management  -     semaglutide (Ozempic) 1 mg/dose (2 mg/1.5 mL) sub-q pen; 1 mg by SubCUTAneous route every seven (7) days. 2. BMI 31.0-31.9,adult  -     semaglutide (Ozempic) 1 mg/dose (2 mg/1.5 mL) sub-q pen; 1 mg by SubCUTAneous route every seven (7) days. 3. Prediabetes  -     semaglutide (Ozempic) 1 mg/dose (2 mg/1.5 mL) sub-q pen; 1 mg by SubCUTAneous route every seven (7) days. She has about 3 weeks remaining of the 0.5 mg weekly dose and then will titrate to the 1 mg weekly dose  Reviewed medication effects, risks, benefits, precautions and possible side effects  Doing well on current regimen thus far  Reviewed diet, nutrition, exercise, weight management, BMI/goals.   Follow up in 3 months, sooner prn

## 2023-03-07 NOTE — PROGRESS NOTES
Chief Complaint   Patient presents with    Medication Evaluation     ozempic     1. \"Have you been to the ER, urgent care clinic since your last visit? Hospitalized since your last visit? \" No    2. \"Have you seen or consulted any other health care providers outside of the [address] [place] [address] since your last visit? \" No     3. For patients aged 39-70: Has the patient had a colonoscopy / FIT/ Cologuard? No      If the patient is female:    4. For patients aged 41-77: Has the patient had a mammogram within the past 2 years? Yes - no Care Gap present 2/10/23 TRACY Rousseau [facility]      5. For patients aged 21-65: Has the patient had a pap smear?  Yes - no Care Gap present Dr Kelton Mills about 18 months

## 2023-06-05 NOTE — TELEPHONE ENCOUNTER
----- Message from [address] [place] [address]. coayaka sent at 6/5/2023  1:35 PM EDT -----  Regarding: Refill  Contact: [phone]  Thanks, CAROLINA. I forgot about scheduling the appointment. May I do that with you? I have no doses left and an due for it on Wednesday. I am taking it weekly.      Loco Rockwell 3/7/23 I scheduled her for 6/15

## 2023-06-06 RX ORDER — SEMAGLUTIDE 1.34 MG/ML
1 INJECTION, SOLUTION SUBCUTANEOUS
Qty: 1.5 ML | Refills: 0 | Status: SHIPPED | OUTPATIENT
Start: 2023-06-06

## 2023-06-23 DIAGNOSIS — F41.8 DEPRESSION WITH ANXIETY: Primary | ICD-10-CM

## 2023-06-23 RX ORDER — DESVENLAFAXINE 100 MG/1
100 TABLET, EXTENDED RELEASE ORAL DAILY
Qty: 90 TABLET | Refills: 1 | Status: SHIPPED | OUTPATIENT
Start: 2023-06-23

## 2023-06-23 NOTE — TELEPHONE ENCOUNTER
Chief Complaint   Patient presents with    Medication Refill     Last Office visit 03/07/2023  Next follow Up none on chart at this time.

## 2023-07-26 DIAGNOSIS — Z76.89 ENCOUNTER FOR WEIGHT MANAGEMENT: ICD-10-CM

## 2023-07-26 RX ORDER — SEMAGLUTIDE 2.68 MG/ML
2 INJECTION, SOLUTION SUBCUTANEOUS
Qty: 3 ML | Refills: 1 | Status: SHIPPED | OUTPATIENT
Start: 2023-07-26 | End: 2023-09-25

## 2023-09-25 DIAGNOSIS — Z76.89 ENCOUNTER FOR WEIGHT MANAGEMENT: ICD-10-CM

## 2023-09-25 RX ORDER — SEMAGLUTIDE 2.68 MG/ML
2 INJECTION, SOLUTION SUBCUTANEOUS
Qty: 2 ML | Refills: 0 | Status: SHIPPED | OUTPATIENT
Start: 2023-09-25

## 2023-10-02 ENCOUNTER — TELEPHONE (OUTPATIENT)
Age: 48
End: 2023-10-02

## 2023-10-02 NOTE — TELEPHONE ENCOUNTER
Started prior auth thru cover my meds. It was denied for following reason.   It is covered for type 2 MD.  Faxed notice to [facility]

## 2024-03-18 DIAGNOSIS — F41.8 DEPRESSION WITH ANXIETY: ICD-10-CM

## 2024-03-18 RX ORDER — DESVENLAFAXINE 100 MG/1
100 TABLET, EXTENDED RELEASE ORAL DAILY
Qty: 90 TABLET | Refills: 0 | Status: SHIPPED | OUTPATIENT
Start: 2024-03-18

## 2024-06-05 DIAGNOSIS — F41.8 DEPRESSION WITH ANXIETY: ICD-10-CM

## 2024-06-06 RX ORDER — DESVENLAFAXINE 100 MG/1
100 TABLET, EXTENDED RELEASE ORAL DAILY
Qty: 30 TABLET | Refills: 0 | Status: SHIPPED | OUTPATIENT
Start: 2024-06-06

## 2024-06-06 NOTE — TELEPHONE ENCOUNTER
Last appointment: 6/15/23 MD AGUILAR  Next appointment: 6/25/24 MD AGUILAR  Previous refill encounter(s): 3/18/24 90    Requested Prescriptions     Pending Prescriptions Disp Refills    desvenlafaxine succinate (PRISTIQ) 100 MG TB24 extended release tablet [Pharmacy Med Name: DESVENLAFAXINE SUCCNT ER 100MG] 30 tablet 0     Sig: Take 1 tablet by mouth daily     For Pharmacy Admin Tracking Only    Program: Medication Refill  CPA in place:    Recommendation Provided To:   Intervention Detail: New Rx: 1, reason: Patient Preference  Intervention Accepted By:   Gap Closed?:    Time Spent (min): 5

## 2024-06-25 ENCOUNTER — OFFICE VISIT (OUTPATIENT)
Age: 49
End: 2024-06-25
Payer: COMMERCIAL

## 2024-06-25 VITALS
HEIGHT: 69 IN | TEMPERATURE: 98 F | WEIGHT: 202.2 LBS | OXYGEN SATURATION: 96 % | HEART RATE: 101 BPM | SYSTOLIC BLOOD PRESSURE: 112 MMHG | DIASTOLIC BLOOD PRESSURE: 73 MMHG | BODY MASS INDEX: 29.95 KG/M2

## 2024-06-25 DIAGNOSIS — Z00.00 ANNUAL PHYSICAL EXAM: Primary | ICD-10-CM

## 2024-06-25 DIAGNOSIS — E78.2 MIXED HYPERLIPIDEMIA: ICD-10-CM

## 2024-06-25 DIAGNOSIS — Z11.59 NEED FOR HEPATITIS C SCREENING TEST: ICD-10-CM

## 2024-06-25 DIAGNOSIS — Z11.59 NEED FOR HEPATITIS B SCREENING TEST: ICD-10-CM

## 2024-06-25 DIAGNOSIS — E55.9 VITAMIN D DEFICIENCY: ICD-10-CM

## 2024-06-25 DIAGNOSIS — R73.03 PREDIABETES: ICD-10-CM

## 2024-06-25 PROCEDURE — 99396 PREV VISIT EST AGE 40-64: CPT | Performed by: FAMILY MEDICINE

## 2024-06-25 RX ORDER — ESTRADIOL 0.07 MG/D
1 PATCH, EXTENDED RELEASE TRANSDERMAL
COMMUNITY
Start: 2024-06-05

## 2024-06-25 RX ORDER — SEMAGLUTIDE 0.5 MG/.5ML
0.5 INJECTION, SOLUTION SUBCUTANEOUS
COMMUNITY
Start: 2024-06-01

## 2024-06-25 SDOH — ECONOMIC STABILITY: FOOD INSECURITY: WITHIN THE PAST 12 MONTHS, THE FOOD YOU BOUGHT JUST DIDN'T LAST AND YOU DIDN'T HAVE MONEY TO GET MORE.: NEVER TRUE

## 2024-06-25 SDOH — ECONOMIC STABILITY: FOOD INSECURITY: WITHIN THE PAST 12 MONTHS, YOU WORRIED THAT YOUR FOOD WOULD RUN OUT BEFORE YOU GOT MONEY TO BUY MORE.: NEVER TRUE

## 2024-06-25 SDOH — ECONOMIC STABILITY: HOUSING INSECURITY
IN THE LAST 12 MONTHS, WAS THERE A TIME WHEN YOU DID NOT HAVE A STEADY PLACE TO SLEEP OR SLEPT IN A SHELTER (INCLUDING NOW)?: NO

## 2024-06-25 SDOH — ECONOMIC STABILITY: INCOME INSECURITY: HOW HARD IS IT FOR YOU TO PAY FOR THE VERY BASICS LIKE FOOD, HOUSING, MEDICAL CARE, AND HEATING?: NOT HARD AT ALL

## 2024-06-25 ASSESSMENT — PATIENT HEALTH QUESTIONNAIRE - PHQ9
SUM OF ALL RESPONSES TO PHQ QUESTIONS 1-9: 0
7. TROUBLE CONCENTRATING ON THINGS, SUCH AS READING THE NEWSPAPER OR WATCHING TELEVISION: NOT AT ALL
10. IF YOU CHECKED OFF ANY PROBLEMS, HOW DIFFICULT HAVE THESE PROBLEMS MADE IT FOR YOU TO DO YOUR WORK, TAKE CARE OF THINGS AT HOME, OR GET ALONG WITH OTHER PEOPLE: NOT DIFFICULT AT ALL
SUM OF ALL RESPONSES TO PHQ QUESTIONS 1-9: 0
5. POOR APPETITE OR OVEREATING: NOT AT ALL
SUM OF ALL RESPONSES TO PHQ QUESTIONS 1-9: 0
2. FEELING DOWN, DEPRESSED OR HOPELESS: NOT AT ALL
4. FEELING TIRED OR HAVING LITTLE ENERGY: NOT AT ALL
SUM OF ALL RESPONSES TO PHQ QUESTIONS 1-9: 0
1. LITTLE INTEREST OR PLEASURE IN DOING THINGS: NOT AT ALL
8. MOVING OR SPEAKING SO SLOWLY THAT OTHER PEOPLE COULD HAVE NOTICED. OR THE OPPOSITE, BEING SO FIGETY OR RESTLESS THAT YOU HAVE BEEN MOVING AROUND A LOT MORE THAN USUAL: NOT AT ALL
9. THOUGHTS THAT YOU WOULD BE BETTER OFF DEAD, OR OF HURTING YOURSELF: NOT AT ALL
6. FEELING BAD ABOUT YOURSELF - OR THAT YOU ARE A FAILURE OR HAVE LET YOURSELF OR YOUR FAMILY DOWN: NOT AT ALL
3. TROUBLE FALLING OR STAYING ASLEEP: NOT AT ALL
SUM OF ALL RESPONSES TO PHQ9 QUESTIONS 1 & 2: 0

## 2024-06-25 ASSESSMENT — ENCOUNTER SYMPTOMS
RESPIRATORY NEGATIVE: 1
EYES NEGATIVE: 1
NAUSEA: 0
VOMITING: 0
ABDOMINAL PAIN: 0

## 2024-06-25 NOTE — PROGRESS NOTES
Chief Complaint   Patient presents with    Annual Exam     Not fasting      HISTORY OF PRESENT ILLNESS   HPI  Annual CPE, not fasting for labs today. Diet/Weight: has struggled w/ her weight for several years; attended [facility] x 6 months, took Metformin and Phentermine but only lost about 10-15 lbs and didn't really tolerate those well; 6-9-21 through 10-1-21 did [employer] Meal Plan (low calorie, more green veggies, lean protein) but that was not successful either; her weight had been 199-208 lbs from 2019 to 8/2021 but since then her weight had been going up and staying in the 220's; started Ozempic 9-21-22; got down to 197 lbs as of 6-2023 but then had to stop after a while per insurance/availability; started a telehealth weight mgt program 4-2024 run by an NP, gets nutrition counseling, meal planning ciera of low carb/high protein diet, started Zepbound 4-2024 x 1 month then changed to Wegovy due to lack of efficacy, took Wegovy x 2 weeks then stopped it due to diarrhea which is now slowly improving, she plans to reach out to the NP to discuss other options next, appts there are currently monthly and her last visit was on 6/13/24; personal goal 170 lbs; Exercise: walks dog 3-4 x a week x 1/2 hr; Caffeine: 1-2 servings of coffee or Diet Coke a few x a week; Sess her gyn for well woman visits/gyn exams. She is perimenopausal, having erratic periods once every few months, sometimes more, sometimes less. She was prescribed estrogen patch to use cyclically.          REVIEW OF SYMPTOMS   Review of Systems   Constitutional: Negative.    HENT: Negative.     Eyes: Negative.    Respiratory: Negative.     Cardiovascular: Negative.    Gastrointestinal:  Negative for abdominal pain, nausea and vomiting.   Endocrine: Negative.    Musculoskeletal: Negative.    Neurological: Negative.    Psychiatric/Behavioral: Negative.               PROBLEM LIST/MEDICAL HISTORY     Patient Active Problem List    Diagnosis

## 2024-06-25 NOTE — PROGRESS NOTES
Chief Complaint   Patient presents with    Annual Exam     \"Have you been to the ER, urgent care clinic since your last visit?  Hospitalized since your last visit?\"    NO    “Have you seen or consulted any other health care providers outside of [facility] since your last visit?”    NO    “Have you had a colorectal cancer screening such as a colonoscopy/FIT/Cologuard?    [facility]- Dr. Landa  Oct 2023    No colonoscopy on file  No cologuard on file  No FIT/FOBT on file   No flexible sigmoidoscopy on file                    6/15/2023     9:00 AM   PHQ-9    Little interest or pleasure in doing things 0   Feeling down, depressed, or hopeless 0   PHQ-2 Score 0   PHQ-9 Total Score 0           Financial Resource Strain: Low Risk  (6/12/2023)    Overall Financial Resource Strain (CARDIA)     Difficulty of Paying Living Expenses: Not very hard      Food Insecurity: Not on file (6/12/2023)          Health Maintenance Due   Topic Date Due    Hepatitis B vaccine (1 of 3 - 3-dose series) Never done    HIV screen  Never done    Colorectal Cancer Screen  Never done    COVID-19 Vaccine (4 - 2023-24 season) 09/01/2023    A1C test (Diabetic or Prediabetic)  10/24/2023    Depression Monitoring  06/15/2024

## 2024-07-01 ENCOUNTER — HOSPITAL ENCOUNTER (OUTPATIENT)
Facility: HOSPITAL | Age: 49
Discharge: HOME | End: 2024-07-04

## 2024-07-01 DIAGNOSIS — R73.03 PREDIABETES: ICD-10-CM

## 2024-07-01 DIAGNOSIS — Z11.59 NEED FOR HEPATITIS C SCREENING TEST: ICD-10-CM

## 2024-07-01 DIAGNOSIS — Z00.00 ANNUAL PHYSICAL EXAM: ICD-10-CM

## 2024-07-01 DIAGNOSIS — E55.9 VITAMIN D DEFICIENCY: ICD-10-CM

## 2024-07-01 DIAGNOSIS — Z11.59 NEED FOR HEPATITIS B SCREENING TEST: ICD-10-CM

## 2024-07-01 DIAGNOSIS — E78.2 MIXED HYPERLIPIDEMIA: ICD-10-CM

## 2024-07-01 LAB
25(OH)D3 SERPL-MCNC: 23.1 NG/ML (ref 30–100)
ALBUMIN SERPL-MCNC: 3.9 G/DL (ref 3.5–5)
ALBUMIN/GLOB SERPL: 1.2 (ref 1.1–2.2)
ALP SERPL-CCNC: 84 U/L (ref 45–117)
ALT SERPL-CCNC: 47 U/L (ref 12–78)
ANION GAP SERPL CALC-SCNC: 1 MMOL/L (ref 5–15)
AST SERPL-CCNC: 30 U/L (ref 15–37)
BILIRUB SERPL-MCNC: 0.6 MG/DL (ref 0.2–1)
BUN SERPL-MCNC: 11 MG/DL (ref 6–20)
BUN/CREAT SERPL: 20 (ref 12–20)
CALCIUM SERPL-MCNC: 9.2 MG/DL (ref 8.5–10.1)
CHLORIDE SERPL-SCNC: 107 MMOL/L (ref 97–108)
CHOLEST SERPL-MCNC: 227 MG/DL
CO2 SERPL-SCNC: 30 MMOL/L (ref 21–32)
CREAT SERPL-MCNC: 0.56 MG/DL (ref 0.55–1.02)
ERYTHROCYTE [DISTWIDTH] IN BLOOD BY AUTOMATED COUNT: 12.3 % (ref 11.5–14.5)
EST. AVERAGE GLUCOSE BLD GHB EST-MCNC: 94 MG/DL
GLOBULIN SER CALC-MCNC: 3.3 G/DL (ref 2–4)
GLUCOSE SERPL-MCNC: 95 MG/DL (ref 65–100)
HBA1C MFR BLD: 4.9 % (ref 4–5.6)
HBV SURFACE AB SER QL: REACTIVE
HBV SURFACE AB SER-ACNC: >1000 MIU/ML
HBV SURFACE AG SER QL: <0.1 INDEX
HBV SURFACE AG SER QL: NEGATIVE
HCT VFR BLD AUTO: 40.8 % (ref 35–47)
HCV AB SER IA-ACNC: <0.02 INDEX
HCV AB SERPL QL IA: NONREACTIVE
HDLC SERPL-MCNC: 49 MG/DL
HDLC SERPL: 4.6 (ref 0–5)
HGB BLD-MCNC: 13.6 G/DL (ref 11.5–16)
LDLC SERPL CALC-MCNC: 130.6 MG/DL (ref 0–100)
MCH RBC QN AUTO: 30.4 PG (ref 26–34)
MCHC RBC AUTO-ENTMCNC: 33.3 G/DL (ref 30–36.5)
MCV RBC AUTO: 91.3 FL (ref 80–99)
NRBC # BLD: 0 K/UL (ref 0–0.01)
NRBC BLD-RTO: 0 PER 100 WBC
PLATELET # BLD AUTO: 310 K/UL (ref 150–400)
PMV BLD AUTO: 9.6 FL (ref 8.9–12.9)
POTASSIUM SERPL-SCNC: 4 MMOL/L (ref 3.5–5.1)
PROT SERPL-MCNC: 7.2 G/DL (ref 6.4–8.2)
RBC # BLD AUTO: 4.47 M/UL (ref 3.8–5.2)
SODIUM SERPL-SCNC: 138 MMOL/L (ref 136–145)
TRIGL SERPL-MCNC: 237 MG/DL
TSH SERPL DL<=0.05 MIU/L-ACNC: 1.67 UIU/ML (ref 0.36–3.74)
VLDLC SERPL CALC-MCNC: 47.4 MG/DL
WBC # BLD AUTO: 3.6 K/UL (ref 3.6–11)

## 2024-07-02 DIAGNOSIS — F41.8 DEPRESSION WITH ANXIETY: ICD-10-CM

## 2024-07-02 NOTE — TELEPHONE ENCOUNTER
PCP: Estella Daugherty MD      No future appointments.    Last seen: 6/25/2024    Last prescribed by Kandice on 6/6/24 for Qty:30    *Patient requesting refill for 90 day*    Requested Prescriptions     Pending Prescriptions Disp Refills    desvenlafaxine succinate (PRISTIQ) 100 MG TB24 extended release tablet [Pharmacy Med Name: DESVENLAFAXINE SUCCNT ER 100MG] 90 tablet 1     Sig: TAKE 1 TABLET BY MOUTH EVERY DAY

## 2024-07-05 RX ORDER — DESVENLAFAXINE 100 MG/1
100 TABLET, EXTENDED RELEASE ORAL DAILY
Qty: 90 TABLET | Refills: 3 | Status: SHIPPED | OUTPATIENT
Start: 2024-07-05

## 2025-04-17 ENCOUNTER — TELEPHONE (OUTPATIENT)
Age: 50
End: 2025-04-17

## 2025-04-26 SDOH — ECONOMIC STABILITY: FOOD INSECURITY: WITHIN THE PAST 12 MONTHS, YOU WORRIED THAT YOUR FOOD WOULD RUN OUT BEFORE YOU GOT MONEY TO BUY MORE.: NEVER TRUE

## 2025-04-26 SDOH — ECONOMIC STABILITY: TRANSPORTATION INSECURITY
IN THE PAST 12 MONTHS, HAS THE LACK OF TRANSPORTATION KEPT YOU FROM MEDICAL APPOINTMENTS OR FROM GETTING MEDICATIONS?: NO

## 2025-04-26 SDOH — ECONOMIC STABILITY: INCOME INSECURITY: IN THE LAST 12 MONTHS, WAS THERE A TIME WHEN YOU WERE NOT ABLE TO PAY THE MORTGAGE OR RENT ON TIME?: NO

## 2025-04-26 SDOH — ECONOMIC STABILITY: FOOD INSECURITY: WITHIN THE PAST 12 MONTHS, THE FOOD YOU BOUGHT JUST DIDN'T LAST AND YOU DIDN'T HAVE MONEY TO GET MORE.: NEVER TRUE

## 2025-04-26 SDOH — ECONOMIC STABILITY: TRANSPORTATION INSECURITY
IN THE PAST 12 MONTHS, HAS LACK OF TRANSPORTATION KEPT YOU FROM MEETINGS, WORK, OR FROM GETTING THINGS NEEDED FOR DAILY LIVING?: NO

## 2025-04-29 ENCOUNTER — OFFICE VISIT (OUTPATIENT)
Age: 50
End: 2025-04-29
Payer: COMMERCIAL

## 2025-04-29 VITALS
HEIGHT: 69 IN | SYSTOLIC BLOOD PRESSURE: 108 MMHG | OXYGEN SATURATION: 98 % | RESPIRATION RATE: 16 BRPM | TEMPERATURE: 98.4 F | DIASTOLIC BLOOD PRESSURE: 68 MMHG | HEART RATE: 91 BPM | BODY MASS INDEX: 28.76 KG/M2 | WEIGHT: 194.2 LBS

## 2025-04-29 DIAGNOSIS — E78.2 MIXED HYPERLIPIDEMIA: ICD-10-CM

## 2025-04-29 DIAGNOSIS — R73.03 PREDIABETES: ICD-10-CM

## 2025-04-29 DIAGNOSIS — R74.8 ELEVATED LIVER ENZYMES: Primary | ICD-10-CM

## 2025-04-29 PROCEDURE — 99214 OFFICE O/P EST MOD 30 MIN: CPT | Performed by: FAMILY MEDICINE

## 2025-04-29 ASSESSMENT — PATIENT HEALTH QUESTIONNAIRE - PHQ9
SUM OF ALL RESPONSES TO PHQ QUESTIONS 1-9: 0
10. IF YOU CHECKED OFF ANY PROBLEMS, HOW DIFFICULT HAVE THESE PROBLEMS MADE IT FOR YOU TO DO YOUR WORK, TAKE CARE OF THINGS AT HOME, OR GET ALONG WITH OTHER PEOPLE: NOT DIFFICULT AT ALL
9. THOUGHTS THAT YOU WOULD BE BETTER OFF DEAD, OR OF HURTING YOURSELF: NOT AT ALL
5. POOR APPETITE OR OVEREATING: NOT AT ALL
SUM OF ALL RESPONSES TO PHQ QUESTIONS 1-9: 0
4. FEELING TIRED OR HAVING LITTLE ENERGY: NOT AT ALL
6. FEELING BAD ABOUT YOURSELF - OR THAT YOU ARE A FAILURE OR HAVE LET YOURSELF OR YOUR FAMILY DOWN: NOT AT ALL
SUM OF ALL RESPONSES TO PHQ QUESTIONS 1-9: 0
SUM OF ALL RESPONSES TO PHQ QUESTIONS 1-9: 0
2. FEELING DOWN, DEPRESSED OR HOPELESS: NOT AT ALL
7. TROUBLE CONCENTRATING ON THINGS, SUCH AS READING THE NEWSPAPER OR WATCHING TELEVISION: NOT AT ALL
3. TROUBLE FALLING OR STAYING ASLEEP: NOT AT ALL
1. LITTLE INTEREST OR PLEASURE IN DOING THINGS: NOT AT ALL
8. MOVING OR SPEAKING SO SLOWLY THAT OTHER PEOPLE COULD HAVE NOTICED. OR THE OPPOSITE, BEING SO FIGETY OR RESTLESS THAT YOU HAVE BEEN MOVING AROUND A LOT MORE THAN USUAL: NOT AT ALL

## 2025-04-29 ASSESSMENT — ENCOUNTER SYMPTOMS
CONSTIPATION: 0
GASTROINTESTINAL NEGATIVE: 1
COLOR CHANGE: 0
ABDOMINAL PAIN: 0
EYES NEGATIVE: 1
DIARRHEA: 0
RESPIRATORY NEGATIVE: 1
NAUSEA: 0
VOMITING: 0
BLOOD IN STOOL: 0

## 2025-04-29 NOTE — PROGRESS NOTES
Chief Complaint   Patient presents with    Discuss Labs     Ordered by another provider     1. \"Have you been to the ER, urgent care clinic since your last visit?  Hospitalized since your last visit?\" No    2. \"Have you seen or consulted any other health care providers outside of the [facility] since your last visit?\" Cherelle Lim NP for compounding Wegovy    3. For patients aged 45-75: Has the patient had a colonoscopy / FIT/ Cologuard? Yes - no Care Gap present 10/2023 Polyps x 3, Repeat 3 yrs, Dr. Landa w/ [facility]       If the patient is female:    4. For patients aged 40-74: Has the patient had a mammogram within the past 2 years? Yes - no Care Gap present 3/2025 @ [facility]       5. For patients aged 21-65: Has the patient had a pap smear? Yes - no Care Gap present gyn Dr Judith Metcalf last year @ [facility]

## 2025-04-30 LAB
ALBUMIN SERPL-MCNC: 3.9 G/DL (ref 3.5–5)
ALBUMIN/GLOB SERPL: 1.1 (ref 1.1–2.2)
ALP SERPL-CCNC: 83 U/L (ref 45–117)
ALT SERPL-CCNC: 31 U/L (ref 12–78)
AST SERPL-CCNC: 29 U/L (ref 15–37)
BILIRUB DIRECT SERPL-MCNC: <0.1 MG/DL (ref 0–0.2)
BILIRUB SERPL-MCNC: 0.3 MG/DL (ref 0.2–1)
FERRITIN SERPL-MCNC: 31 NG/ML (ref 26–388)
GLOBULIN SER CALC-MCNC: 3.4 G/DL (ref 2–4)
HBV SURFACE AB SER QL: REACTIVE
HBV SURFACE AB SER-ACNC: >1000 MIU/ML
HBV SURFACE AG SER QL: <0.1 INDEX
HBV SURFACE AG SER QL: NEGATIVE
HCV AB SER IA-ACNC: 0.03 INDEX
HCV AB SERPL QL IA: NONREACTIVE
IRON SERPL-MCNC: 58 UG/DL (ref 35–150)
PROT SERPL-MCNC: 7.3 G/DL (ref 6.4–8.2)

## 2025-05-01 LAB
ANA SER QL: NEGATIVE
SMA IGG SER-ACNC: 12 UNITS (ref 0–19)
THYROPEROXIDASE AB SERPL-ACNC: <9 IU/ML (ref 0–34)

## 2025-05-02 LAB
A1AT SERPL-MCNC: 137 MG/DL (ref 101–187)
CERULOPLASMIN SERPL-MCNC: 28.3 MG/DL (ref 19–39)
TRANSFERRIN SERPL-MCNC: 293 MG/DL (ref 192–364)

## 2025-05-04 ENCOUNTER — RESULTS FOLLOW-UP (OUTPATIENT)
Age: 50
End: 2025-05-04

## 2025-06-29 DIAGNOSIS — F41.8 DEPRESSION WITH ANXIETY: ICD-10-CM

## 2025-07-01 RX ORDER — DESVENLAFAXINE 100 MG/1
100 TABLET, EXTENDED RELEASE ORAL DAILY
Qty: 90 TABLET | Refills: 0 | Status: SHIPPED | OUTPATIENT
Start: 2025-07-01

## 2025-07-01 NOTE — TELEPHONE ENCOUNTER
[birth year]        Gail Chapin [id number]      Dear Brayan Beal,    [facility] records indicate that you have outstanding lab work and or testing that was ordered for you and has not yet been completed:  Orders Placed This Encounter PCP: Estella Daugherty MD    Last appt: 4/29/2025     Future Appointments   Date Time Provider Department Center   9/11/2025  3:20 PM Estella Daugherty MD [facility]       Requested Prescriptions     Pending Prescriptions Disp Refills    desvenlafaxine succinate (PRISTIQ) 100 MG TB24 extended release tablet [Pharmacy Med Name: DESVENLAFAXINE SUCCNT ER 100MG] 90 tablet 3     Sig: TAKE 1 TABLET BY MOUTH EVERY DAY       Prior labs and Blood pressures:  BP Readings from Last 3 Encounters:   04/29/25 108/68   06/25/24 112/73   06/15/23 108/64     Lab Results   Component Value Date/Time     07/01/2024 08:50 AM    K 4.0 07/01/2024 08:50 AM     07/01/2024 08:50 AM    CO2 30 07/01/2024 08:50 AM    BUN 11 07/01/2024 08:50 AM    GFRAA >60 08/26/2021 09:16 AM     Lab Results   Component Value Date/Time    EQF1JLCW 5.1 11/03/2021 12:00 PM     Lab Results   Component Value Date/Time    CHOL 227 07/01/2024 08:50 AM    HDL 49 07/01/2024 08:50 AM    .6 07/01/2024 08:50 AM    .4 08/26/2021 09:16 AM    VLDL 47.4 07/01/2024 08:50 AM     No results found for: \"VITD3\"    Lab Results   Component Value Date/Time    TSH 1.67 07/01/2024 08:50 AM

## 2025-08-23 DIAGNOSIS — E78.2 MIXED HYPERLIPIDEMIA: ICD-10-CM

## 2025-08-23 DIAGNOSIS — R73.03 PREDIABETES: ICD-10-CM

## 2025-08-23 DIAGNOSIS — E55.9 VITAMIN D DEFICIENCY: ICD-10-CM

## 2025-08-23 DIAGNOSIS — Z00.00 ANNUAL PHYSICAL EXAM: Primary | ICD-10-CM
